# Patient Record
Sex: FEMALE | Race: WHITE | NOT HISPANIC OR LATINO | URBAN - METROPOLITAN AREA
[De-identification: names, ages, dates, MRNs, and addresses within clinical notes are randomized per-mention and may not be internally consistent; named-entity substitution may affect disease eponyms.]

---

## 2017-05-20 ENCOUNTER — INPATIENT (INPATIENT)
Facility: HOSPITAL | Age: 82
LOS: 3 days | Discharge: HOSPICE | End: 2017-05-24
Attending: FAMILY MEDICINE | Admitting: FAMILY MEDICINE
Payer: MEDICARE

## 2017-05-20 VITALS
OXYGEN SATURATION: 89 % | TEMPERATURE: 99 F | HEART RATE: 89 BPM | SYSTOLIC BLOOD PRESSURE: 125 MMHG | DIASTOLIC BLOOD PRESSURE: 54 MMHG | RESPIRATION RATE: 16 BRPM | HEIGHT: 64 IN | WEIGHT: 195.11 LBS

## 2017-05-20 DIAGNOSIS — Z96.659 PRESENCE OF UNSPECIFIED ARTIFICIAL KNEE JOINT: Chronic | ICD-10-CM

## 2017-05-20 DIAGNOSIS — D49.519 NEOPLASM OF UNSPECIFIED BEHAVIOR OF UNSPECIFIED KIDNEY: ICD-10-CM

## 2017-05-20 DIAGNOSIS — I50.21 ACUTE SYSTOLIC (CONGESTIVE) HEART FAILURE: ICD-10-CM

## 2017-05-20 DIAGNOSIS — N39.0 URINARY TRACT INFECTION, SITE NOT SPECIFIED: ICD-10-CM

## 2017-05-20 DIAGNOSIS — Z90.12 ACQUIRED ABSENCE OF LEFT BREAST AND NIPPLE: Chronic | ICD-10-CM

## 2017-05-20 DIAGNOSIS — C79.31 SECONDARY MALIGNANT NEOPLASM OF BRAIN: ICD-10-CM

## 2017-05-20 DIAGNOSIS — I48.91 UNSPECIFIED ATRIAL FIBRILLATION: ICD-10-CM

## 2017-05-20 LAB
ALBUMIN SERPL ELPH-MCNC: 3.2 G/DL — LOW (ref 3.3–5)
ALP SERPL-CCNC: 99 U/L — SIGNIFICANT CHANGE UP (ref 40–120)
ALT FLD-CCNC: 20 U/L — SIGNIFICANT CHANGE UP (ref 12–78)
ANION GAP SERPL CALC-SCNC: 10 MMOL/L — SIGNIFICANT CHANGE UP (ref 5–17)
ANION GAP SERPL CALC-SCNC: 11 MMOL/L — SIGNIFICANT CHANGE UP (ref 5–17)
APPEARANCE UR: (no result)
APTT BLD: 37.9 SEC — HIGH (ref 27.5–37.4)
AST SERPL-CCNC: 15 U/L — SIGNIFICANT CHANGE UP (ref 15–37)
BACTERIA # UR AUTO: (no result)
BASOPHILS # BLD AUTO: 0.1 K/UL — SIGNIFICANT CHANGE UP (ref 0–0.2)
BASOPHILS # BLD AUTO: 0.1 K/UL — SIGNIFICANT CHANGE UP (ref 0–0.2)
BASOPHILS NFR BLD AUTO: 0.4 % — SIGNIFICANT CHANGE UP (ref 0–2)
BASOPHILS NFR BLD AUTO: 0.7 % — SIGNIFICANT CHANGE UP (ref 0–2)
BILIRUB SERPL-MCNC: 0.9 MG/DL — SIGNIFICANT CHANGE UP (ref 0.2–1.2)
BILIRUB UR-MCNC: NEGATIVE — SIGNIFICANT CHANGE UP
BLD GP AB SCN SERPL QL: SIGNIFICANT CHANGE UP
BUN SERPL-MCNC: 18 MG/DL — SIGNIFICANT CHANGE UP (ref 7–23)
BUN SERPL-MCNC: 19 MG/DL — SIGNIFICANT CHANGE UP (ref 7–23)
CALCIUM SERPL-MCNC: 9.1 MG/DL — SIGNIFICANT CHANGE UP (ref 8.5–10.1)
CALCIUM SERPL-MCNC: 9.6 MG/DL — SIGNIFICANT CHANGE UP (ref 8.5–10.1)
CHLORIDE SERPL-SCNC: 103 MMOL/L — SIGNIFICANT CHANGE UP (ref 96–108)
CHLORIDE SERPL-SCNC: 105 MMOL/L — SIGNIFICANT CHANGE UP (ref 96–108)
CK SERPL-CCNC: 28 U/L — SIGNIFICANT CHANGE UP (ref 26–192)
CO2 SERPL-SCNC: 24 MMOL/L — SIGNIFICANT CHANGE UP (ref 22–31)
CO2 SERPL-SCNC: 28 MMOL/L — SIGNIFICANT CHANGE UP (ref 22–31)
COLOR SPEC: YELLOW — SIGNIFICANT CHANGE UP
CREAT SERPL-MCNC: 0.91 MG/DL — SIGNIFICANT CHANGE UP (ref 0.5–1.3)
CREAT SERPL-MCNC: 0.98 MG/DL — SIGNIFICANT CHANGE UP (ref 0.5–1.3)
DIFF PNL FLD: (no result)
EOSINOPHIL # BLD AUTO: 0 K/UL — SIGNIFICANT CHANGE UP (ref 0–0.5)
EOSINOPHIL # BLD AUTO: 0 K/UL — SIGNIFICANT CHANGE UP (ref 0–0.5)
EOSINOPHIL NFR BLD AUTO: 0 % — SIGNIFICANT CHANGE UP (ref 0–6)
EOSINOPHIL NFR BLD AUTO: 0 % — SIGNIFICANT CHANGE UP (ref 0–6)
EPI CELLS # UR: (no result)
GLUCOSE SERPL-MCNC: 133 MG/DL — HIGH (ref 70–99)
GLUCOSE SERPL-MCNC: 98 MG/DL — SIGNIFICANT CHANGE UP (ref 70–99)
GLUCOSE UR QL: NEGATIVE MG/DL — SIGNIFICANT CHANGE UP
GRAM STN FLD: SIGNIFICANT CHANGE UP
HCT VFR BLD CALC: 37.6 % — SIGNIFICANT CHANGE UP (ref 34.5–45)
HCT VFR BLD CALC: 41.9 % — SIGNIFICANT CHANGE UP (ref 34.5–45)
HGB BLD-MCNC: 12.4 G/DL — SIGNIFICANT CHANGE UP (ref 11.5–15.5)
HGB BLD-MCNC: 13.8 G/DL — SIGNIFICANT CHANGE UP (ref 11.5–15.5)
INR BLD: 1.48 RATIO — HIGH (ref 0.88–1.16)
KETONES UR-MCNC: NEGATIVE — SIGNIFICANT CHANGE UP
LACTATE SERPL-SCNC: 1.3 MMOL/L — SIGNIFICANT CHANGE UP (ref 0.7–2)
LEUKOCYTE ESTERASE UR-ACNC: (no result)
LIDOCAIN IGE QN: 53 U/L — LOW (ref 73–393)
LYMPHOCYTES # BLD AUTO: 0.2 K/UL — LOW (ref 1–3.3)
LYMPHOCYTES # BLD AUTO: 0.4 K/UL — LOW (ref 1–3.3)
LYMPHOCYTES # BLD AUTO: 1.2 % — LOW (ref 13–44)
LYMPHOCYTES # BLD AUTO: 2 % — LOW (ref 13–44)
MAGNESIUM SERPL-MCNC: 2 MG/DL — SIGNIFICANT CHANGE UP (ref 1.6–2.6)
MCHC RBC-ENTMCNC: 31.2 PG — SIGNIFICANT CHANGE UP (ref 27–34)
MCHC RBC-ENTMCNC: 31.4 PG — SIGNIFICANT CHANGE UP (ref 27–34)
MCHC RBC-ENTMCNC: 33 GM/DL — SIGNIFICANT CHANGE UP (ref 32–36)
MCHC RBC-ENTMCNC: 33 GM/DL — SIGNIFICANT CHANGE UP (ref 32–36)
MCV RBC AUTO: 94.7 FL — SIGNIFICANT CHANGE UP (ref 80–100)
MCV RBC AUTO: 95.4 FL — SIGNIFICANT CHANGE UP (ref 80–100)
MONOCYTES # BLD AUTO: 1 K/UL — HIGH (ref 0–0.9)
MONOCYTES # BLD AUTO: 1.5 K/UL — HIGH (ref 0–0.9)
MONOCYTES NFR BLD AUTO: 5 % — SIGNIFICANT CHANGE UP (ref 2–14)
MONOCYTES NFR BLD AUTO: 7.3 % — SIGNIFICANT CHANGE UP (ref 2–14)
NEUTROPHILS # BLD AUTO: 18 K/UL — HIGH (ref 1.8–7.4)
NEUTROPHILS # BLD AUTO: 18.1 K/UL — HIGH (ref 1.8–7.4)
NEUTROPHILS NFR BLD AUTO: 90 % — HIGH (ref 43–77)
NEUTROPHILS NFR BLD AUTO: 93.3 % — HIGH (ref 43–77)
NITRITE UR-MCNC: POSITIVE
NT-PROBNP SERPL-SCNC: 6138 PG/ML — HIGH (ref 0–450)
PH UR: 6 — SIGNIFICANT CHANGE UP (ref 5–8)
PLATELET # BLD AUTO: 238 K/UL — SIGNIFICANT CHANGE UP (ref 150–400)
PLATELET # BLD AUTO: 298 K/UL — SIGNIFICANT CHANGE UP (ref 150–400)
POTASSIUM SERPL-MCNC: 3.9 MMOL/L — SIGNIFICANT CHANGE UP (ref 3.5–5.3)
POTASSIUM SERPL-MCNC: 4.2 MMOL/L — SIGNIFICANT CHANGE UP (ref 3.5–5.3)
POTASSIUM SERPL-SCNC: 3.9 MMOL/L — SIGNIFICANT CHANGE UP (ref 3.5–5.3)
POTASSIUM SERPL-SCNC: 4.2 MMOL/L — SIGNIFICANT CHANGE UP (ref 3.5–5.3)
PROT SERPL-MCNC: 7 GM/DL — SIGNIFICANT CHANGE UP (ref 6–8.3)
PROT UR-MCNC: 30 MG/DL
PROTHROM AB SERPL-ACNC: 16.1 SEC — HIGH (ref 9.8–12.7)
RBC # BLD: 3.94 M/UL — SIGNIFICANT CHANGE UP (ref 3.8–5.2)
RBC # BLD: 4.43 M/UL — SIGNIFICANT CHANGE UP (ref 3.8–5.2)
RBC # FLD: 14.2 % — SIGNIFICANT CHANGE UP (ref 10.3–14.5)
RBC # FLD: 14.5 % — SIGNIFICANT CHANGE UP (ref 10.3–14.5)
RBC CASTS # UR COMP ASSIST: (no result) /HPF (ref 0–4)
SODIUM SERPL-SCNC: 140 MMOL/L — SIGNIFICANT CHANGE UP (ref 135–145)
SODIUM SERPL-SCNC: 141 MMOL/L — SIGNIFICANT CHANGE UP (ref 135–145)
SP GR SPEC: 1.01 — SIGNIFICANT CHANGE UP (ref 1.01–1.02)
SPECIMEN SOURCE: SIGNIFICANT CHANGE UP
SPECIMEN SOURCE: SIGNIFICANT CHANGE UP
TROPONIN I SERPL-MCNC: 0.06 NG/ML — HIGH (ref 0.01–0.04)
TROPONIN I SERPL-MCNC: 0.07 NG/ML — HIGH (ref 0.01–0.04)
TYPE + AB SCN PNL BLD: SIGNIFICANT CHANGE UP
UROBILINOGEN FLD QL: NEGATIVE MG/DL — SIGNIFICANT CHANGE UP
WBC # BLD: 19.4 K/UL — HIGH (ref 3.8–10.5)
WBC # BLD: 20 K/UL — HIGH (ref 3.8–10.5)
WBC # FLD AUTO: 19.4 K/UL — HIGH (ref 3.8–10.5)
WBC # FLD AUTO: 20 K/UL — HIGH (ref 3.8–10.5)
WBC UR QL: SIGNIFICANT CHANGE UP /HPF (ref 0–5)

## 2017-05-20 PROCEDURE — 71275 CT ANGIOGRAPHY CHEST: CPT | Mod: 26

## 2017-05-20 PROCEDURE — 99285 EMERGENCY DEPT VISIT HI MDM: CPT

## 2017-05-20 PROCEDURE — 99221 1ST HOSP IP/OBS SF/LOW 40: CPT

## 2017-05-20 PROCEDURE — 93010 ELECTROCARDIOGRAM REPORT: CPT

## 2017-05-20 PROCEDURE — 71010: CPT | Mod: 26

## 2017-05-20 PROCEDURE — 70450 CT HEAD/BRAIN W/O DYE: CPT | Mod: 26

## 2017-05-20 PROCEDURE — 74177 CT ABD & PELVIS W/CONTRAST: CPT | Mod: 26

## 2017-05-20 RX ORDER — FUROSEMIDE 40 MG
40 TABLET ORAL DAILY
Qty: 0 | Refills: 0 | Status: DISCONTINUED | OUTPATIENT
Start: 2017-05-20 | End: 2017-05-20

## 2017-05-20 RX ORDER — SODIUM CHLORIDE 9 MG/ML
1500 INJECTION INTRAMUSCULAR; INTRAVENOUS; SUBCUTANEOUS ONCE
Qty: 0 | Refills: 0 | Status: COMPLETED | OUTPATIENT
Start: 2017-05-20 | End: 2017-05-20

## 2017-05-20 RX ORDER — CEFEPIME 1 G/1
1000 INJECTION, POWDER, FOR SOLUTION INTRAMUSCULAR; INTRAVENOUS EVERY 12 HOURS
Qty: 0 | Refills: 0 | Status: DISCONTINUED | OUTPATIENT
Start: 2017-05-20 | End: 2017-05-21

## 2017-05-20 RX ORDER — ONDANSETRON 8 MG/1
4 TABLET, FILM COATED ORAL ONCE
Qty: 0 | Refills: 0 | Status: COMPLETED | OUTPATIENT
Start: 2017-05-20 | End: 2017-05-20

## 2017-05-20 RX ORDER — ATENOLOL 25 MG/1
25 TABLET ORAL DAILY
Qty: 0 | Refills: 0 | Status: DISCONTINUED | OUTPATIENT
Start: 2017-05-20 | End: 2017-05-24

## 2017-05-20 RX ORDER — POTASSIUM CHLORIDE 20 MEQ
20 PACKET (EA) ORAL DAILY
Qty: 0 | Refills: 0 | Status: DISCONTINUED | OUTPATIENT
Start: 2017-05-20 | End: 2017-05-23

## 2017-05-20 RX ORDER — CEFEPIME 1 G/1
1000 INJECTION, POWDER, FOR SOLUTION INTRAMUSCULAR; INTRAVENOUS ONCE
Qty: 0 | Refills: 0 | Status: COMPLETED | OUTPATIENT
Start: 2017-05-20 | End: 2017-05-20

## 2017-05-20 RX ORDER — FUROSEMIDE 40 MG
40 TABLET ORAL DAILY
Qty: 0 | Refills: 0 | Status: DISCONTINUED | OUTPATIENT
Start: 2017-05-20 | End: 2017-05-23

## 2017-05-20 RX ORDER — ASPIRIN/CALCIUM CARB/MAGNESIUM 324 MG
325 TABLET ORAL ONCE
Qty: 0 | Refills: 0 | Status: COMPLETED | OUTPATIENT
Start: 2017-05-20 | End: 2017-05-20

## 2017-05-20 RX ORDER — SODIUM CHLORIDE 9 MG/ML
1000 INJECTION INTRAMUSCULAR; INTRAVENOUS; SUBCUTANEOUS ONCE
Qty: 0 | Refills: 0 | Status: COMPLETED | OUTPATIENT
Start: 2017-05-20 | End: 2017-05-20

## 2017-05-20 RX ORDER — VANCOMYCIN HCL 1 G
1000 VIAL (EA) INTRAVENOUS ONCE
Qty: 0 | Refills: 0 | Status: COMPLETED | OUTPATIENT
Start: 2017-05-20 | End: 2017-05-20

## 2017-05-20 RX ORDER — VALSARTAN 80 MG/1
80 TABLET ORAL DAILY
Qty: 0 | Refills: 0 | Status: DISCONTINUED | OUTPATIENT
Start: 2017-05-20 | End: 2017-05-24

## 2017-05-20 RX ORDER — ACETAMINOPHEN 500 MG
1000 TABLET ORAL ONCE
Qty: 0 | Refills: 0 | Status: COMPLETED | OUTPATIENT
Start: 2017-05-20 | End: 2017-05-20

## 2017-05-20 RX ORDER — PANTOPRAZOLE SODIUM 20 MG/1
40 TABLET, DELAYED RELEASE ORAL DAILY
Qty: 0 | Refills: 0 | Status: DISCONTINUED | OUTPATIENT
Start: 2017-05-20 | End: 2017-05-23

## 2017-05-20 RX ORDER — DEXAMETHASONE 0.5 MG/5ML
4 ELIXIR ORAL EVERY 6 HOURS
Qty: 0 | Refills: 0 | Status: DISCONTINUED | OUTPATIENT
Start: 2017-05-20 | End: 2017-05-24

## 2017-05-20 RX ORDER — SIMVASTATIN 20 MG/1
10 TABLET, FILM COATED ORAL AT BEDTIME
Qty: 0 | Refills: 0 | Status: DISCONTINUED | OUTPATIENT
Start: 2017-05-20 | End: 2017-05-24

## 2017-05-20 RX ADMIN — CEFEPIME 100 MILLIGRAM(S): 1 INJECTION, POWDER, FOR SOLUTION INTRAMUSCULAR; INTRAVENOUS at 06:42

## 2017-05-20 RX ADMIN — ATENOLOL 25 MILLIGRAM(S): 25 TABLET ORAL at 12:00

## 2017-05-20 RX ADMIN — Medication 4 MILLIGRAM(S): at 18:54

## 2017-05-20 RX ADMIN — CEFEPIME 100 MILLIGRAM(S): 1 INJECTION, POWDER, FOR SOLUTION INTRAMUSCULAR; INTRAVENOUS at 22:28

## 2017-05-20 RX ADMIN — SODIUM CHLORIDE 1000 MILLILITER(S): 9 INJECTION INTRAMUSCULAR; INTRAVENOUS; SUBCUTANEOUS at 06:00

## 2017-05-20 RX ADMIN — Medication 40 MILLIGRAM(S): at 11:59

## 2017-05-20 RX ADMIN — Medication 250 MILLIGRAM(S): at 07:55

## 2017-05-20 RX ADMIN — Medication 1000 MILLIGRAM(S): at 06:36

## 2017-05-20 RX ADMIN — ONDANSETRON 4 MILLIGRAM(S): 8 TABLET, FILM COATED ORAL at 06:00

## 2017-05-20 RX ADMIN — SODIUM CHLORIDE 1500 MILLILITER(S): 9 INJECTION INTRAMUSCULAR; INTRAVENOUS; SUBCUTANEOUS at 07:55

## 2017-05-20 RX ADMIN — Medication 20 MILLIEQUIVALENT(S): at 12:00

## 2017-05-20 RX ADMIN — Medication 325 MILLIGRAM(S): at 07:55

## 2017-05-20 RX ADMIN — Medication 40 MILLIGRAM(S): at 12:08

## 2017-05-20 NOTE — H&P ADULT - NSHPPHYSICALEXAM_GEN_ALL_CORE
Physical Exam: Vital Signs Last 24 Hrs  T(C): 37.2, Max: 38.4 (05-20 @ 06:43)  T(F): 99, Max: 101.1 (05-20 @ 06:43)  HR: 83 (83 - 103)  BP: 134/67 (124/56 - 151/93)  BP(mean): --  RR: 22 (16 - 30)  SpO2: 98% (89% - 98%)        HEENT: PRRL EOMI,[place]    MOUTH/TEETH/GUMS: Clear    NECK: JVD +1    LUNGS: mild basilar rales, mild upper airway congestion,     HEART: S1,S2  irregularly irregular    ABDOMEN: soft nontender    EXTREMITIES: edema Yes: trace pedal edema +1    MUSCULOSKELETAL: arthritic changes    NEURO: no tremor, no focal signs.    SKIN: no rash   : CVA negative, rectal deferred.

## 2017-05-20 NOTE — ED ADULT NURSE REASSESSMENT NOTE - NS ED NURSE REASSESS COMMENT FT1
Pt's exhibiting rectal temp of 101.1. MD rosa made aware. Tylenol and Cefepime 1 gm given as ordered. Vanco and 1.5 ml of NS to follow will continue to monitor.

## 2017-05-20 NOTE — ED PROVIDER NOTE - MEDICAL DECISION MAKING DETAILS
Pt with fever, (+) troponin, UTI.  awaiting CT results but pt will be admitted to the hospitalist service.

## 2017-05-20 NOTE — PROVIDER CONTACT NOTE (OTHER) - SITUATION
left a message on department phone
Call placed to  Neurosurgery aware of consult.
Call placed to Dr.Bhardi [facility] aware of consult.
notified service; spoke to Adry

## 2017-05-20 NOTE — ED ADULT NURSE REASSESSMENT NOTE - NS ED NURSE REASSESS COMMENT FT1
Urine sample collected via straight cath. Pt tolerated well. Urine yellow and cloudy with foul odor. 100 ml in return. Sample sent to lab.

## 2017-05-20 NOTE — ED PROVIDER NOTE - OBJECTIVE STATEMENT
89 y/o female with a h/o AFib on eliquis, dementia, angina, HTN, anemia, incontinence, anxiety, BIBA from [facility] assisted [facility] for SOB, nausea, vomiting and abd discomfort since today. As per nursing home paperwork, pt is confused at baseline.

## 2017-05-20 NOTE — H&P ADULT - ASSESSMENT
89 y/o female with a h/o AFib on eliquis, dementia, angina, HTN, anemia, incontinence, anxiety, who was sent to the ER from the [facility], found with brain metastasis small hemorrhage, renal mass, Cistitis/UTI, CHF, elevated troponin. 89 y/o female with a h/o AFib on eliquis, dementia, angina, HTN, anemia, incontinence, anxiety, who was sent to the ER from the [facility], found with brain metastasis small hemorrhage, renal mass, Cystitis/UTI, CHF, elevated troponin.

## 2017-05-20 NOTE — ED PROVIDER NOTE - DETAILS:
I, Kristine Maradiaga, performed the initial face to face bedside interview with this patient regarding history of present illness, review of symptoms and relevant past medical, social and family history.  I completed an independent physical examination.  I was the initial provider who evaluated this patient.   The history, relevant review of systems, past medical and surgical history, medical decision making, and physical examination was documented by the scribe in my presence and I attest to the accuracy of the documentation.

## 2017-05-20 NOTE — ED ADULT NURSE NOTE - OBJECTIVE STATEMENT
Pt presented to ED for SOB. As per EMS, pt was c/o SOB, N&V and abd discomfort today. Pt poor historian due to Dementia, and confused at baseline. Upon arrival to ED, Pt confused and oriented to self only, satting @ 98 on O2 2L, and Denies any discomfort at this time.

## 2017-05-20 NOTE — CONSULT NOTE ADULT - PROBLEM SELECTOR RECOMMENDATION 9
1. MRI Brain with and without contrast for consideration of possible Stereotaxic Radiosurgery  2. Decadron for Cerebral Edema

## 2017-05-20 NOTE — H&P ADULT - HISTORY OF PRESENT ILLNESS
The patient is a 89 yo female a h/o AFib on eliquis, dementia, angina, HTN, anemia, incontinence, anxiety,who was sent to the ER form the [facility] living for SOB, nausea, vomiting and abd discomfort.  The patient is not able to give Hx. secondary to dementia, [place]. Transfer records reviewed.   The patient is in bed , alert, disoriented is not c/o pain.

## 2017-05-20 NOTE — ED ADULT NURSE REASSESSMENT NOTE - NS ED NURSE REASSESS COMMENT FT1
Tachypnea, pulse ox 92-94% prior to transfer to .  Dr. Azul notified, IV and PO Lasix administered.  Pulse ox 96-97% on 4L after Lasix administration.  Pt incontinent of large amount of urine, pericare and diaper change done.

## 2017-05-20 NOTE — ED PROVIDER NOTE - MUSCULOSKELETAL, MLM
full muscle strength in all 4 extremities. Spine appears normal, range of motion is not limited, no muscle or joint tenderness

## 2017-05-20 NOTE — H&P ADULT - NSHPLABSRESULTS_GEN_ALL_CORE
13.8   19.4  )-----------( 298      ( 20 May 2017 06:01 )             41.9       05-20    141  |  103  |  19  ----------------------------<  133<H>  3.9   |  28  |  0.98    Ca    9.6      20 May 2017 06:01  Mg     2.0     05-20    TPro  7.0  /  Alb  3.2<L>  /  TBili  0.9  /  DBili  x   /  AST  15  /  ALT  20  /  AlkPhos  99  05-20    UA: wbc TNTC, bld Lg., Nitrate positive  CT brain : Vasogenic edema in the left frontal lobe with associated small foci of   high attenuation may represent hemorrhage, calcification, or small   underlying lesion. No mass effect or midline shift. Primary consideration   is metastasis.  CT abd:Bilateral solid renal masses indicative of neoplasm. No collecting system   or renal vascular invasion. 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  Thickening of the uterine endometrium up to 10 mm. Tissue sampling is   advised.Large hiatal hernia containing a nearly intrathoracic stomach. No bowel   obstruction.Diffuse urinary bladder wall thickening and perivesical stranding   indicative of cystitis.  CTA Chest: Neg for PE,Cardiomegaly, pulmonary edema, and trace bilateral pleural effusions  EKG atrial fib at 88bpm no acute ST changes.

## 2017-05-20 NOTE — H&P ADULT - NSHPREVIEWOFSYSTEMS_GEN_ALL_CORE
ROS: not obtainable secondary to Dementia, [place], denies CP, had mild SOB, had severe hearing loss.

## 2017-05-21 DIAGNOSIS — R74.8 ABNORMAL LEVELS OF OTHER SERUM ENZYMES: ICD-10-CM

## 2017-05-21 DIAGNOSIS — Z71.89 OTHER SPECIFIED COUNSELING: ICD-10-CM

## 2017-05-21 LAB — NT-PROBNP SERPL-SCNC: HIGH PG/ML (ref 0–450)

## 2017-05-21 PROCEDURE — 70553 MRI BRAIN STEM W/O & W/DYE: CPT | Mod: 26

## 2017-05-21 RX ORDER — CEFTRIAXONE 500 MG/1
INJECTION, POWDER, FOR SOLUTION INTRAMUSCULAR; INTRAVENOUS
Qty: 0 | Refills: 0 | Status: DISCONTINUED | OUTPATIENT
Start: 2017-05-21 | End: 2017-05-24

## 2017-05-21 RX ORDER — CEFTRIAXONE 500 MG/1
2 INJECTION, POWDER, FOR SOLUTION INTRAMUSCULAR; INTRAVENOUS EVERY 24 HOURS
Qty: 0 | Refills: 0 | Status: DISCONTINUED | OUTPATIENT
Start: 2017-05-22 | End: 2017-05-24

## 2017-05-21 RX ORDER — CEFTRIAXONE 500 MG/1
2 INJECTION, POWDER, FOR SOLUTION INTRAMUSCULAR; INTRAVENOUS ONCE
Qty: 0 | Refills: 0 | Status: COMPLETED | OUTPATIENT
Start: 2017-05-21 | End: 2017-05-21

## 2017-05-21 RX ADMIN — Medication 4 MILLIGRAM(S): at 18:05

## 2017-05-21 RX ADMIN — PANTOPRAZOLE SODIUM 40 MILLIGRAM(S): 20 TABLET, DELAYED RELEASE ORAL at 11:23

## 2017-05-21 RX ADMIN — CEFTRIAXONE 100 GRAM(S): 500 INJECTION, POWDER, FOR SOLUTION INTRAMUSCULAR; INTRAVENOUS at 13:09

## 2017-05-21 RX ADMIN — Medication 4 MILLIGRAM(S): at 11:23

## 2017-05-21 RX ADMIN — Medication 4 MILLIGRAM(S): at 05:54

## 2017-05-21 RX ADMIN — ATENOLOL 25 MILLIGRAM(S): 25 TABLET ORAL at 05:53

## 2017-05-21 RX ADMIN — Medication 20 MILLIEQUIVALENT(S): at 11:23

## 2017-05-21 RX ADMIN — Medication 40 MILLIGRAM(S): at 05:54

## 2017-05-21 RX ADMIN — VALSARTAN 80 MILLIGRAM(S): 80 TABLET ORAL at 05:55

## 2017-05-21 RX ADMIN — SIMVASTATIN 10 MILLIGRAM(S): 20 TABLET, FILM COATED ORAL at 22:08

## 2017-05-21 RX ADMIN — CEFEPIME 100 MILLIGRAM(S): 1 INJECTION, POWDER, FOR SOLUTION INTRAMUSCULAR; INTRAVENOUS at 05:53

## 2017-05-21 RX ADMIN — Medication 4 MILLIGRAM(S): at 00:12

## 2017-05-21 NOTE — PROGRESS NOTE ADULT - SUBJECTIVE AND OBJECTIVE BOX
Neurosurgery Progress note               This is a 89 y/o female with a Past Medical History significant for a baseline Dementia she is unable to provide any history . History  is obtained from NH paper work and chart h/o AFib on Eliquis , dementia, angina, HTN, anemia, incontinence, anxiety. she is sent from [facility] for SOB, nausea, vomiting and abd discomfort since last night . Work up revealed Multiple lesions on a non contrast Brain CT , also identified Bilateral Renal Masses consistent with neoplasm as well as Retroperitoneal tumor deposit measuring 2.6 cm . We are called to evaluate. She is seen and examined in the ED , no family at the bedside to provide history. 		    : Seen this afternoon upon return from MRI, By RN report she had episode last night of a mental status change sternal rub could not arouse her , however today she is seen awake alert following commands no new complaints of pain     MEDICATIONS  (STANDING):  simvastatin 10milliGRAM(s) Oral at bedtime  ATENolol  Tablet 25milliGRAM(s) Oral daily  potassium chloride    Tablet ER 20milliEquivalent(s) Oral daily  dexamethasone  Injectable 4milliGRAM(s) IV Push every 6 hours  pantoprazole  Injectable 40milliGRAM(s) IV Push daily  valsartan 80milliGRAM(s) Oral daily  furosemide   Injectable 40milliGRAM(s) IV Push daily  cefTRIAXone   IVPB  IV Intermittent     Vital Signs Last 24 Hrs  T(C): 36.2, Max: 37 (- @ 06:07)  T(F): 97.1, Max: 98.6 (05- @ 06:07)  HR: 81 (76 - 91)  BP: 140/76 (134/78 - 154/82)  BP(mean): --  RR: 17 (17 - 17)  SpO2: 100% (100% - 100%)    PHYSICAL EXAM:  Pleasant , Obese Female , Hard of hearing   Extremities:  1+ pitting edema   Neuro:     Mental status:   The patient is alert, attentive, and oriented to self, only   Speech is clear and fluent   Fund of knowledge, is limited due to Dementia   Mood:  Confused   Cranial nerves:  CN II: Visual fields are full to confrontation.   CN III, IV, and VI: At primary gaze, there is no eye deviation, No ptosis.   CN V: Facial sensation is intact to light touch   CN VII: Face is symmetric with normal eye closure and smile.  CN VII: Hard of Hearing   CN IX, X: Palate elevates symmetrically. Phonation is normal.  CN XI: Head turning and shoulder shrug are intact  CN XII: Tongue is midline with normal movements and no atrophy.  Motor:   No Pronator drift  Normal Muscle bulk and tone   Strength is full bilaterally. No Noted pill rolling, No Tremor  Deltoids & Biceps, Triceps 5/5 bilaterally    / Wrist extension / flex 5/5 bilaterally  Iliopsoas, Hamstrings & Quads 5/5 bilaterally   Anterior Tibialis & Gastrocnemius 5/5 bilaterally   Dorsiflexion & Plantar Flex 5/5 bilaterally  Sensory: intact to light touch  Gait:  is not tested       LABS:                        12.4   20.0  )-----------( 238      ( 20 May 2017 12:18 )             37.6     05-20    140  |  105  |  18  ----------------------------<  98  4.2   |  24  |  0.91    Ca    9.1      20 May 2017 12:18  Mg     2.0     -    TPro  7.0  /  Alb  3.2<L>  /  TBili  0.9  /  DBili  x   /  AST  15  /  ALT  20  /  Alk Phos  99  05-20    PT/INR - ( 20 May 2017 06:01 )   PT: 16.1 sec;   INR: 1.48 ratio         PTT - ( 20 May 2017 06:01 )  PTT:37.9 sec  Urinalysis Basic - ( 20 May 2017 05:17 )    Color: Yellow / Appearance: very cloudy / S.015 / pH: x  Gluc: x / Ketone: Negative  / Bili: Negative / Urobili: Negative mg/dL   Blood: x / Protein: 30 mg/dL / Nitrite: Positive   Leuk Esterase: Moderate / RBC: 3-5 /HPF / WBC TNTC /HPF   Sq Epi: x / Non Sq Epi: Moderate / Bacteria: Few    RADIOLOGY & ADDITIONAL TESTS:  EXAM:  CT BRAIN                            PROCEDURE DATE:  2017        INTERPRETATION:  CT BRAIN    HISTORY:  vomiting/dementia; on eloquis    TECHNIQUE: CT of the head was performed without intravenous contrast.   Multiplanar reformatted images were then generated from the axial   acquired data.  This study was performed using automatic exposure control   (radiation dose reduction software) to obtain a diagnostic image quality   scan with patient dose as low as reasonably achievable.    COMPARISON: None available    FINDINGS:     INTRACRANIAL FINDINGS: Left frontal white matter hypoattenuation   suggestive of vasogenic edema. Associated small foci of hyperdensity may   represent hemorrhage, calcification, or small underlying lesion.No mass   effect or midline shift. No acute territorial infarct.    EXTRACRANIAL FINDINGS: There is expansion of the sella turcica with   surrounding skull base bony destruction of the sella and clivus. The   orbital contents are unremarkable. The visualized paranasal sinuses are   well aerated. The mastoid air cells are clear.    IMPRESSION:     Vasogenic edema in the left frontal lobe with associated small foci of   high attenuation may represent hemorrhage, calcification, or small   underlyinglesion. No mass effect or midline shift. Primary consideration   is metastasis.    Expansile lesion in the sella turcica with surrounding bony destruction   of the skull base. Underlying metastasis is likely given the findings in   the abdomen and pelvis.    Critical value:  I discussed the finding of this report with Dr. Dr. Renee at 9:00 AM on 2017.  Critical value policy of the hospital   was followed.  Read back and confirmation of receipt of this   communication was performed.  Thisverbal communication supplements the   text report of this document.    KELSY KELLEY   This document has been electronically signed. May 20 2017  9:03AM      MRI of the brain Completed But not read yet     Dx: Renal Cell Carcinoma , Metastatic Disease to brain with Cerebral edema     A/P: 89 year old female with known Dementia presented with Nausea and vomiting , work up revealed Bilateral  Renal masses , retroperitoneal Tumor deposit and multiple Brain Lesions with associated Vasogenic Edema with minimal mass effect . Stable     1. Leukocytosis due to Decadron and Possible UTI   2. Continue decadron 4 mg IV Q 6   3. Diagnosis to be discussed with Grandson / Son per Primary team [phone]  4. Possible XRT to Brain Mets , No surgical intervention recommended given advanced age   5. Awaiting MRI reading   6. Case discussed with Dr Cordoba who concurs

## 2017-05-21 NOTE — PROGRESS NOTE ADULT - PROBLEM SELECTOR PLAN 6
discussed in length with family at bedside  DNR/DNI, form signed  if any changes family wants to call Aiyana [phone] cell, [phone]

## 2017-05-21 NOTE — PROGRESS NOTE ADULT - SUBJECTIVE AND OBJECTIVE BOX
Subjective:  Patient is a 89y old  Female who presents with a chief complaint of Nausea, Vomiting, SOB, abdominal discomfort     HPI:  The patient is a 89 yo female a h/o AFib on eliquis, dementia, angina, HTN, anemia, incontinence, anxiety, h/o of prior brain tumor s/p cyber knife, h/o breast CA s/p mastectomy  admitted on   form the [facility] assisted living for SOB, nausea, vomiting and abd discomfort.     - Patient seen and examined at bedside earlier today, hard to hear, poor historian, denies pain, more awake and alert, poor PO intake, family at bedside, plan of care discussed in length     Review of system- Rest of the review of system are negative except mentioned in HPI    OBJECTIVE:   T(C): 36.2, Max: 37 ( @ 06:07)  HR: 81 (76 - 84)  BP: 142/83 (140/76 - 154/82)  RR: 15 (15 - 17)  SpO2: 96% (96% - 100%)  Wt(kg): --  Daily     Daily Weight in k.1 (21 May 2017 07:39)    PHYSICAL EXAM:  GENERAL: NAD  NERVOUS SYSTEM:  Alert & Oriented X1, non focal   HEAD:  Atraumatic, Normocephalic  EYES: EOMI, PERRLA, conjunctiva and sclera clear  HEENT: Moist mucous membranes  NECK: Supple, No JVD  CHEST/LUNG: Clear to auscultation bilaterally; No rales, no rhonchi, no wheezing, or rubs  HEART: Regular rate and rhythm; No murmurs, rubs, or gallops  ABDOMEN: Soft, Nontender, Nondistended; Bowel sounds present  GENITOURINARY- Voiding, no suprapubic tenderness  EXTREMITIES:  2+ Peripheral Pulses, No clubbing, cyanosis, or edema  MUSCULOSKELETAL:- No muscle tenderness, Muscle tone normal, No joint tenderness, no Joint swelling, Joint range of motion-normal  SKIN-no rash, no lesion    LABS:                     140  |  105  |  18  ----------------------------<  98  4.2   |  24  |  0.91    Ca    9.1      20 May 2017 12:18  Mg     2.0         TPro  7.0  /  Alb  3.2<L>  /  TBili  0.9  /  DBili  x   /  AST  15  /  ALT  20  /  AlkPhos  99  05-20                        12.4   20.0  )-----------( 238      ( 20 May 2017 12:18 )             37.6       CARDIAC MARKERS ( 20 May 2017 12:18 )  0.071 ng/mL / x     / x     / x     / x      CARDIAC MARKERS ( 20 May 2017 09:38 )  0.076 ng/mL / x     / x     / x     / x      CARDIAC MARKERS ( 20 May 2017 06:01 )  0.060 ng/mL / x     / 28 U/L / x     / x            LIVER FUNCTIONS - ( 20 May 2017 06:01 )  Alb: 3.2 g/dL / Pro: 7.0 gm/dL / ALK PHOS: 99 U/L / ALT: 20 U/L / AST: 15 U/L / GGT: x             PT/INR - ( 20 May 2017 06:01 )   PT: 16.1 sec;   INR: 1.48 ratio         PTT - ( 20 May 2017 06:01 )  PTT:37.9 sec    Urinalysis Basic - ( 20 May 2017 05:17 )    Color: Yellow / Appearance: very cloudy / S.015 / pH: x  Gluc: x / Ketone: Negative  / Bili: Negative / Urobili: Negative mg/dL   Blood: x / Protein: 30 mg/dL / Nitrite: Positive   Leuk Esterase: Moderate / RBC: 3-5 /HPF / WBC TNTC /HPF   Sq Epi: x / Non Sq Epi: Moderate / Bacteria: Few                 12.4   20.0  )-----------( 238      ( 20 May 2017 12:18 )             37.6     05-20    140  |  105  |  18  ----------------------------<  98  4.2   |  24  |  0.91    Ca    9.1      20 May 2017 12:18  Mg     2.0     05-20    TPro  7.0  /  Alb  3.2<L>  /  TBili  0.9  /  DBili  x   /  AST  15  /  ALT  20  /  AlkPhos  99  05-20    PT/INR - ( 20 May 2017 06:01 )   PT: 16.1 sec;   INR: 1.48 ratio         PTT - ( 20 May 2017 06:01 )  PTT:37.9 sec  CARDIAC MARKERS ( 20 May 2017 12:18 )  0.071 ng/mL / x     / x     / x     / x      CARDIAC MARKERS ( 20 May 2017 09:38 )  0.076 ng/mL / x     / x     / x     / x      CARDIAC MARKERS ( 20 May 2017 06:01 )  0.060 ng/mL / x     / 28 U/L / x     / x          Urinalysis Basic - ( 20 May 2017 05:17 )    Color: Yellow / Appearance: very cloudy / S.015 / pH: x  Gluc: x / Ketone: Negative  / Bili: Negative / Urobili: Negative mg/dL   Blood: x / Protein: 30 mg/dL / Nitrite: Positive   Leuk Esterase: Moderate / RBC: 3-5 /HPF / WBC TNTC /HPF   Sq Epi: x / Non Sq Epi: Moderate / Bacteria: Few        CAPILLARY BLOOD GLUCOSE  RECENT CULTURES:  Culture - Blood (17 @ 06:01)    Gram Stain:   Growth in aerobic bottle: Gram Negative Rods    Specimen Source: .Blood None    Culture Results:   Growth in aerobic bottle: Escherichia coli  Susceptibility to follow.    Culture - Blood (17 @ 06:01)    Gram Stain:   Growth in aerobic and anaerobic bottles: Gram Negative Rods    Specimen Source: .Blood None    Culture Results:   Growth in aerobic and anaerobic bottles: Escherichia coli  See previous culture [id number]      RADIOLOGY & ADDITIONAL TESTS:    CT ABDOMEN AND PELVIS IC                2017  Bilateral solid renal masses indicative of neoplasm. No collecting system   or renal vascular invasion. 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    Thickening of the uterine endometrium up to 10 mm. Tissue sampling is   advised.    Large hiatal hernia containing a nearly intrathoracic stomach. No bowel   obstruction.    Diffuse urinary bladder wall thickening and perivesical stranding   indicative of cystitis.    These results were communicated to Dr. Renee by me over telephone at   9:00 AM on 2017.     CT ANGIO CHEST PE PROTOCOL IC         2017  No pulmonary embolism in the visualized segments. Limited evaluation of   the lingular and left lower lobe subsegmental and segmental branches   secondary to respiratory motion.    Cardiomegaly, pulmonary edema, and trace bilateral pleural effusions.    1.1 cm soft tissue nodule in the medial upper right breast, for which   follow-up dedicated mammographic imaging is advised.    CT BRAIN       2017  Vasogenic edema in the left frontal lobe with associated small foci of   high attenuation may represent hemorrhage, calcification, or small   underlyinglesion. No mass effect or midline shift. Primary consideration   is metastasis.    Expansile lesion in the sella turcica with surrounding bony destruction   of the skull base. Underlying metastasis is likely given the findings in   the abdomen and pelvis.    MEDICATIONS  (STANDING):  simvastatin 10milliGRAM(s) Oral at bedtime  ATENolol  Tablet 25milliGRAM(s) Oral daily  potassium chloride    Tablet ER 20milliEquivalent(s) Oral daily  dexamethasone  Injectable 4milliGRAM(s) IV Push every 6 hours  pantoprazole  Injectable 40milliGRAM(s) IV Push daily  valsartan 80milliGRAM(s) Oral daily  furosemide   Injectable 40milliGRAM(s) IV Push daily  cefTRIAXone   IVPB  IV Intermittent

## 2017-05-21 NOTE — PROGRESS NOTE ADULT - PROBLEM SELECTOR PLAN 4
Obtained Neurosurgical consult from Dr. Cordoba, case discussed with Dr. Cordoba.  CT head noted  -MRI brain - pending

## 2017-05-21 NOTE — PROGRESS NOTE ADULT - ASSESSMENT
89 yo female a h/o AFib on eliquis, dementia, angina, HTN, anemia, incontinence, anxiety, h/o of prior brain tumor s/p cyber knife, h/o breast CA s/p mastectomy  admitted on 5/20  form the [facility] for SOB, nausea, vomiting and abd discomfort.  Found brain metastasis small hemorrhage, renal mass, Cystitis/UTI, CHF, elevated troponin.

## 2017-05-21 NOTE — PROGRESS NOTE ADULT - PROBLEM SELECTOR PLAN 1
telemetry,   systolic vs diastolic  contiue IV laurie, BB, statins,  Diovan, cardiology consult.  2 d echo

## 2017-05-22 DIAGNOSIS — N28.9 DISORDER OF KIDNEY AND URETER, UNSPECIFIED: ICD-10-CM

## 2017-05-22 LAB
-  AMIKACIN: SIGNIFICANT CHANGE UP
-  AMIKACIN: SIGNIFICANT CHANGE UP
-  AMPICILLIN/SULBACTAM: SIGNIFICANT CHANGE UP
-  AMPICILLIN/SULBACTAM: SIGNIFICANT CHANGE UP
-  AMPICILLIN: SIGNIFICANT CHANGE UP
-  AMPICILLIN: SIGNIFICANT CHANGE UP
-  AZTREONAM: SIGNIFICANT CHANGE UP
-  AZTREONAM: SIGNIFICANT CHANGE UP
-  CEFAZOLIN: SIGNIFICANT CHANGE UP
-  CEFAZOLIN: SIGNIFICANT CHANGE UP
-  CEFEPIME: SIGNIFICANT CHANGE UP
-  CEFEPIME: SIGNIFICANT CHANGE UP
-  CEFOXITIN: SIGNIFICANT CHANGE UP
-  CEFOXITIN: SIGNIFICANT CHANGE UP
-  CEFTAZIDIME: SIGNIFICANT CHANGE UP
-  CEFTAZIDIME: SIGNIFICANT CHANGE UP
-  CEFTRIAXONE: SIGNIFICANT CHANGE UP
-  CEFTRIAXONE: SIGNIFICANT CHANGE UP
-  CIPROFLOXACIN: SIGNIFICANT CHANGE UP
-  CIPROFLOXACIN: SIGNIFICANT CHANGE UP
-  ERTAPENEM: SIGNIFICANT CHANGE UP
-  ERTAPENEM: SIGNIFICANT CHANGE UP
-  GENTAMICIN: SIGNIFICANT CHANGE UP
-  GENTAMICIN: SIGNIFICANT CHANGE UP
-  IMIPENEM: SIGNIFICANT CHANGE UP
-  IMIPENEM: SIGNIFICANT CHANGE UP
-  LEVOFLOXACIN: SIGNIFICANT CHANGE UP
-  LEVOFLOXACIN: SIGNIFICANT CHANGE UP
-  MEROPENEM: SIGNIFICANT CHANGE UP
-  MEROPENEM: SIGNIFICANT CHANGE UP
-  NITROFURANTOIN: SIGNIFICANT CHANGE UP
-  PIPERACILLIN/TAZOBACTAM: SIGNIFICANT CHANGE UP
-  PIPERACILLIN/TAZOBACTAM: SIGNIFICANT CHANGE UP
-  TOBRAMYCIN: SIGNIFICANT CHANGE UP
-  TOBRAMYCIN: SIGNIFICANT CHANGE UP
-  TRIMETHOPRIM/SULFAMETHOXAZOLE: SIGNIFICANT CHANGE UP
-  TRIMETHOPRIM/SULFAMETHOXAZOLE: SIGNIFICANT CHANGE UP
ANION GAP SERPL CALC-SCNC: 7 MMOL/L — SIGNIFICANT CHANGE UP (ref 5–17)
BUN SERPL-MCNC: 42 MG/DL — HIGH (ref 7–23)
CALCIUM SERPL-MCNC: 10.5 MG/DL — HIGH (ref 8.5–10.1)
CHLORIDE SERPL-SCNC: 104 MMOL/L — SIGNIFICANT CHANGE UP (ref 96–108)
CO2 SERPL-SCNC: 30 MMOL/L — SIGNIFICANT CHANGE UP (ref 22–31)
CREAT SERPL-MCNC: 1.14 MG/DL — SIGNIFICANT CHANGE UP (ref 0.5–1.3)
CULTURE RESULTS: SIGNIFICANT CHANGE UP
GLUCOSE SERPL-MCNC: 133 MG/DL — HIGH (ref 70–99)
HCT VFR BLD CALC: 39 % — SIGNIFICANT CHANGE UP (ref 34.5–45)
HGB BLD-MCNC: 12.7 G/DL — SIGNIFICANT CHANGE UP (ref 11.5–15.5)
MCHC RBC-ENTMCNC: 31 PG — SIGNIFICANT CHANGE UP (ref 27–34)
MCHC RBC-ENTMCNC: 32.6 GM/DL — SIGNIFICANT CHANGE UP (ref 32–36)
MCV RBC AUTO: 95.1 FL — SIGNIFICANT CHANGE UP (ref 80–100)
METHOD TYPE: SIGNIFICANT CHANGE UP
METHOD TYPE: SIGNIFICANT CHANGE UP
NT-PROBNP SERPL-SCNC: 8709 PG/ML — HIGH (ref 0–450)
ORGANISM # SPEC MICROSCOPIC CNT: SIGNIFICANT CHANGE UP
PLATELET # BLD AUTO: 279 K/UL — SIGNIFICANT CHANGE UP (ref 150–400)
POTASSIUM SERPL-MCNC: 4.1 MMOL/L — SIGNIFICANT CHANGE UP (ref 3.5–5.3)
POTASSIUM SERPL-SCNC: 4.1 MMOL/L — SIGNIFICANT CHANGE UP (ref 3.5–5.3)
RBC # BLD: 4.1 M/UL — SIGNIFICANT CHANGE UP (ref 3.8–5.2)
RBC # FLD: 13.9 % — SIGNIFICANT CHANGE UP (ref 10.3–14.5)
SODIUM SERPL-SCNC: 141 MMOL/L — SIGNIFICANT CHANGE UP (ref 135–145)
SPECIMEN SOURCE: SIGNIFICANT CHANGE UP
WBC # BLD: 20.7 K/UL — HIGH (ref 3.8–10.5)
WBC # FLD AUTO: 20.7 K/UL — HIGH (ref 3.8–10.5)

## 2017-05-22 PROCEDURE — 93306 TTE W/DOPPLER COMPLETE: CPT | Mod: 26

## 2017-05-22 RX ADMIN — Medication 40 MILLIGRAM(S): at 06:56

## 2017-05-22 RX ADMIN — Medication 20 MILLIEQUIVALENT(S): at 12:23

## 2017-05-22 RX ADMIN — PANTOPRAZOLE SODIUM 40 MILLIGRAM(S): 20 TABLET, DELAYED RELEASE ORAL at 12:23

## 2017-05-22 RX ADMIN — Medication 4 MILLIGRAM(S): at 12:23

## 2017-05-22 RX ADMIN — Medication 4 MILLIGRAM(S): at 02:30

## 2017-05-22 RX ADMIN — SIMVASTATIN 10 MILLIGRAM(S): 20 TABLET, FILM COATED ORAL at 21:08

## 2017-05-22 RX ADMIN — VALSARTAN 80 MILLIGRAM(S): 80 TABLET ORAL at 06:56

## 2017-05-22 RX ADMIN — CEFTRIAXONE 100 GRAM(S): 500 INJECTION, POWDER, FOR SOLUTION INTRAMUSCULAR; INTRAVENOUS at 12:23

## 2017-05-22 RX ADMIN — Medication 4 MILLIGRAM(S): at 17:27

## 2017-05-22 RX ADMIN — ATENOLOL 25 MILLIGRAM(S): 25 TABLET ORAL at 06:56

## 2017-05-22 RX ADMIN — Medication 4 MILLIGRAM(S): at 06:57

## 2017-05-22 NOTE — PROGRESS NOTE ADULT - SUBJECTIVE AND OBJECTIVE BOX
The patient is a 89 yo female a h/o AFib on eliquis, dementia, angina, HTN, anemia, incontinence, anxiety, hx of breast ca s/p mastectomy, admitted  with c/o SOB, nausea, vomiting and abd discomfort, here afebrile, wbc ct 19.4, UA grossly positive, blood cx growing GNRs, urine cx with Ecoli, CT abd/chest/pelvis with 1.1 cm right upper breast mass, b/l solid renal masses concerning for malignancy and changes c/w cystitis, pt was given IV cefepime/vanco.     no fevers  more awake/alert        MEDICATIONS  (STANDING):  simvastatin 10milliGRAM(s) Oral at bedtime  ATENolol  Tablet 25milliGRAM(s) Oral daily  potassium chloride    Tablet ER 20milliEquivalent(s) Oral daily  dexamethasone  Injectable 4milliGRAM(s) IV Push every 6 hours  pantoprazole  Injectable 40milliGRAM(s) IV Push daily  valsartan 80milliGRAM(s) Oral daily  furosemide   Injectable 40milliGRAM(s) IV Push daily  cefTRIAXone   IVPB  IV Intermittent   cefTRIAXone   IVPB 2Gram(s) IV Intermittent every 24 hours      Vital Signs Last 24 Hrs  T(C): 36.4, Max: 36.4 (- @ 21:20)  T(F): 97.5, Max: 97.5 (- @ 21:20)  HR: 77 (68 - 80)  BP: 133/50 (133/50 - 144/87)  BP(mean): --  RR: 16 (16 - 16)  SpO2: 100% (94% - 100%)              PE:  Constitutional: frail looking  HEENT: NC/AT, EOMI, PERRLA  Neck: supple  Back: no tenderness  Respiratory: clear  Cardiovascular: S1S2 regular, no murmurs  Abdomen: soft, not tender, not distended, positive BS  Genitourinary: deferred  Rectal: deferred  Musculoskeletal: no muscle tenderness, no joint swelling or tenderness  Extremities: no pedal edema  Neurological: no focal deficits  Skin: no rashes    Labs:                        12.7   20.7  )-----------( 279      ( 22 May 2017 06:25 )             39.0     05-22  Culture - Urine (17 @ 05:17)    -  Meropenem: S <=1    -  Tobramycin: S <=4    -  Amikacin: S <=16    -  Cefepime: S <=4    -  Ertapenem: S <=1    -  Imipenem: S <=1    -  Cefoxitin: I 16    -  Ceftazidime: S <=1    -  Gentamicin: S <=4    -  Nitrofurantoin: S <=32    -  Piperacillin/Tazobactam: S <=16    -  Ampicillin: R >16    -  Ampicillin/Sulbactam: S <=8/4    -  Aztreonam: S <=4    -  Cefazolin: R >16    -  Ceftriaxone: S <=1    -  Ciprofloxacin: S <=1    -  Levofloxacin: S <=2    -  Trimethoprim/Sulfamethoxazole: S <=2/38    Specimen Source: .Urine Catheterized    Culture Results:   50,000 - 99,000 CFU/mL Escherichia coli    Organism Identification: Escherichia coli    Organism: Escherichia coli    Method Type: ROGER      141  |  104  |  42<H>  ----------------------------<  133<H>  4.1   |  30  |  1.14    Ca    10.5<H>      22 May 2017 06:25                       Urinalysis Basic - ( 20 May 2017 05:17 )    Color: Yellow / Appearance: very cloudy / S.015 / pH: x  Gluc: x / Ketone: Negative  / Bili: Negative / Urobili: Negative mg/dL   Blood: x / Protein: 30 mg/dL / Nitrite: Positive   Leuk Esterase: Moderate / RBC: 3-5 /HPF / WBC TNTC /HPF   Sq Epi: x / Non Sq Epi: Moderate / Bacteria: Few      Culture - Blood (17 @ 06:01)    Gram Stain:   Growth in aerobic bottle: Gram Negative Rods    Specimen Source: .Blood None    Culture Results:   Growth in aerobic bottle: Escherichia coli  Susceptibility to follow.    Culture - Blood (17 @ 06:01)    Gram Stain:   Growth in aerobic and anaerobic bottles: Gram Negative Rods    Specimen Source: .Blood None    Culture Results:   Growth in aerobic and anaerobic bottles: Escherichia coli  See previous culture [id number]            Radiology:    Advanced directives addressed: full resuscitation

## 2017-05-22 NOTE — PROGRESS NOTE ADULT - ASSESSMENT
The patient is a 89 yo female a h/o AFib on eliquis, dementia, angina, HTN, anemia, incontinence, anxiety, hx of breast ca s/p mastectomy, admitted 5/20 with c/o SOB, nausea, vomiting and abd discomfort, here afebrile, wbc ct 19.4, UA grossly positive, blood cx growing GNRs, urine cx with Ecoli, CT abd/chest/pelvis with 1.1 cm right upper breast mass, b/l solid renal masses concerning for malignancy and changes c/w cystitis, pt was given IV cefepime/vanco.    1. Ecoli sepsis/UTI/CHF/immunocompromised host  -improving  -  IV rocephin [id number] day#2, completed 1 day of cefepime  -urine cx with Ecoli/blood cx with Ecoli  -repeat blood cx  - has renal masses and hx of breast ca and breast lesion concerning for malignancy  - f/u CBC  - supportive care  - monitor temps  -tolerating abx well so far; no side effects noted  -reason for abx use and side effects reviewed with patient    2. other issues- AFib on eliquis, dementia, angina, HTN, anemia, incontinence, anxiety - care per medicine

## 2017-05-22 NOTE — PROGRESS NOTE ADULT - ASSESSMENT
89 yo female a h/o AFib on eliquis, dementia, angina, HTN, anemia, incontinence, anxiety, h/o of prior brain tumor s/p cyber knife, h/o breast CA s/p mastectomy  admitted on 5/20  form the [facility] assisted living for SOB, nausea, vomiting and abd discomfort.  Found brain metastasis small hemorrhage, renal mass, Cystitis/UTI, CHF, elevated troponin.     Problem/Plan - 1:  ·  Problem: Acute systolic congestive heart failure.  Plan: telemetry,   systolic vs diastolic  contiue iv lasix BB, statins,  Diovan, cardiology consult appreciated  2 d echo.     Problem/Plan - 2:  ·  Problem: Urinary tract infection.  Plan: Continue IV rocephin as per ID.  + blood cx, + urine cx.     Problem/Plan - 3:  ·  Problem: Renal neoplasm.  Plan: observation only at her age as per Dr. Bolton.     Problem/Plan - 4:  ·  Problem: Brain metastases.  Plan: Obtained Neurosurgical consult from Dr. Cordoba. No Sx intervention.   CT head noted  -MRI brain -noted    Problem/Plan - 5:  ·  Problem: Atrial fibrillation.  Plan: Hold Eliquis because of brain bleed, Start anticoagulation if cleared by neurosurgery.     Problem/Plan - 6:  Problem: Advanced care planning/counseling discussion. Plan: discussed in length with grand son at bedside  DNR/DNI, form signed  if any changes family wants to call Aiyana [phone] cell, [phone].    Problem/Plan - 7:  ·  Problem: Elevated troponin I level.  Plan: borderline, likely due to demand ischemia from HF.     Problem/Plan - 8:  3 sec pause: cardio f/u. cont tele monitoring for now.     poc discussed with pt, her grandson, and team. 89 yo female a h/o AFib on eliquis, dementia, angina, HTN, anemia, incontinence, anxiety, h/o of prior brain tumor s/p cyber knife, h/o breast CA s/p mastectomy  admitted on 5/20  form the [facility] assisted living for SOB, nausea, vomiting and abd discomfort.  Found brain metastasis small hemorrhage, renal mass, Cystitis/UTI, CHF, elevated troponin.     Problem/Plan - 1:  ·  Problem: Acute systolic congestive heart failure.  Plan: telemetry,   systolic vs diastolic  contiue iv lasix BB, statins,  Diovan, cardiology consult appreciated  2 d echo.     Problem/Plan - 2:  ·  Problem: Urinary tract infection + sepsis with E coli, resolving Plan: Continue IV rocephin as per ID.  + blood cx, + urine cx.     Problem/Plan - 3:  ·  Problem: Renal neoplasm.  Plan: observation only at her age as per Dr. Bolton.     Problem/Plan - 4:  ·  Problem: Brain metastases.  Plan: Obtained Neurosurgical consult from Dr. Cordoba. No Sx intervention.   CT head noted  -MRI brain -noted    Problem/Plan - 5:  ·  Problem: Atrial fibrillation.  Plan: Hold Eliquis because of brain bleed, Start anticoagulation if cleared by neurosurgery.     Problem/Plan - 6:  Problem: Advanced care planning/counseling discussion. Plan: discussed in length with grand son at bedside  DNR/DNI, form signed  if any changes family wants to call Aiyana [phone] cell, [phone].    Problem/Plan - 7:  ·  Problem: Elevated troponin I level.  Plan: borderline, likely due to demand ischemia from HF.     Problem/Plan - 8:  3 sec pause: cardio f/u. cont tele monitoring for now.     poc discussed with pt, her grandson, and team.

## 2017-05-22 NOTE — CONSULT NOTE ADULT - PROBLEM SELECTOR RECOMMENDATION 9
At her age and physical condition these are best observed.  Will be happy to go over this with any interested family members

## 2017-05-22 NOTE — PROGRESS NOTE ADULT - ASSESSMENT
89 year old woman with h/o dementia, prior brain tumor s/p cyber knife and breast ca s/p mastectomy who was admitted with Acute Systolic Congestive Heart Failure and UTI was found to have Bilateral solid renal masses and left frontal lobe lesion with surrounding vasogenic edema. 89 year old woman with h/o dementia, prior brain tumor s/p cyber knife and breast ca s/p mastectomy who was admitted with Acute Systolic Congestive Heart Failure and UTI was found to have bilateral solid renal masses and left frontal lobe lesion with surrounding vasogenic edema as well as a sella mass involving the  right cavernous sinus and skull base. There apears to be surgical defect from previous transphenoidal surgery, exact history unknown given pt has dementia.    PLAN-  No surgical intervention at this time.  Pt has a possible left frontal lobe met vs meningioma with a possible met vs pituitary adenoma  Need to acquire further details of prior brain pathology  Check endocrine status if there is a history of pituitary adenoma

## 2017-05-22 NOTE — CDI QUERY NOTE - NSCDIOTHERTXTBX_GEN_ALL_CORE_HH
Please clarify if Sepsis is Present ? Resolving ? Resolved ? or Ruled-out ?  On admit Temp= 101, HR= 103, WBC= 19.4, lactate= 1.3  Culture - Blood (05.20.17 @ 06:01)    Gram Stain: Growth in aerobic and anaerobic bottles: Gram Negative Rods   Urine culture= 50,000- 90,000 E. Coli  Patient on Rocephin IV qday.  GNR Sepsis documented by infectious disease.    Please clarify ... Patient with GNR Sepsis now resolving ?   ... Sepsis ruled-out ? Patient with UTI and bacteremia, no evidence of sepsis ? Other ? Please clarify

## 2017-05-22 NOTE — PROGRESS NOTE ADULT - SUBJECTIVE AND OBJECTIVE BOX
PRIOR, LYDIA    89y     Female     MR#[id number]    HPI:  The patient is a 89 yo female a h/o AFib on eliquis, dementia, angina, HTN, anemia, incontinence, anxiety ,who was sent to the ER from the [facility] for SOB, nausea, vomiting and abd discomfort. Pt found to have left front lobe lesion with vasogenic edema.    Vital Signs Last 24 Hrs  T(C): 36.4, Max: 36.4 (05-21 @ 21:20)  T(F): 97.5, Max: 97.5 (05-21 @ 21:20)  HR: 68 (68 - 81)  BP: 142/75 (142/75 - 144/87)  BP(mean): --  RR: 16 (15 - 16)  SpO2: 100% (94% - 100%)    PHYSICAL EXAM:      MEDS:  MEDICATIONS  (STANDING):  simvastatin 10milliGRAM(s) Oral at bedtime  ATENolol  Tablet 25milliGRAM(s) Oral daily  potassium chloride    Tablet ER 20milliEquivalent(s) Oral daily  dexamethasone  Injectable 4milliGRAM(s) IV Push every 6 hours  pantoprazole  Injectable 40milliGRAM(s) IV Push daily  valsartan 80milliGRAM(s) Oral daily  furosemide   Injectable 40milliGRAM(s) IV Push daily  cefTRIAXone   IVPB  IV Intermittent   cefTRIAXone   IVPB 2Gram(s) IV Intermittent every 24 hours    LABS:                        12.7   20.7  )-----------( 279      ( 22 May 2017 06:25 )             39.0       05-22    141  |  104  |  42<H>  ----------------------------<  133<H>  4.1   |  30  |  1.14    Ca    10.5<H>      22 May 2017 06:25    IMAGING-  CT HEAD:  Vasogenic edema in the left frontal lobe with associated small foci of   high attenuation may represent hemorrhage, calcification, or small   underlyinglesion. No mass effect or midline shift. Primary consideration   is metastasis.    CT Chest/Abd/Pelvis  Bilateral solid renal masses indicative of neoplasm. No collecting system   or renal vascular invasion. 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 PRIOR, LYDIA    89y     Female     MR#[id number]    HPI:  The patient is a 89 yo female a h/o AFib on eliquis, dementia, angina, HTN, anemia, incontinence, anxiety ,who was sent to the ER from the [facility] for SOB, nausea, vomiting and abd discomfort. Pt found to have left front lobe lesion with vasogenic edema as well as a lesion in the sella eroding into the clivis. Pt also has a history of prior brain tumor- resection(?) and breast cancer     Vital Signs Last 24 Hrs  T(C): 36.4, Max: 36.4 (05-21 @ 21:20)  T(F): 97.5, Max: 97.5 (05-21 @ 21:20)  HR: 68 (68 - 81)  BP: 142/75 (142/75 - 144/87)  RR: 16 (15 - 16)  SpO2: 100% (94% - 100%)    PHYSICAL EXAM:  Constitutional: awake and alert, oriented to self and place   HEENT: PERRLA, EOMI  Neck: Supple.  Respiratory: Breath sounds are clear bilaterally  Cardiovascular: S1 and S2, regular  rhythm  Gastrointestinal: soft, nontender  Extremities: + b/l lower ext edema   Musculoskeletal: no back pain   Skin: No rashes    Neurological exam:  HF: awake and alert oriented to self and place, appropriately  interactive, speech intaact   CN: HAYDE, EOMI, VFF, facial sensation normal, no NLFD, tongue midline  Motor: No pronator drift, Strength 5/5 in all 4 ext, normal bulk and tone, no tremor, rigidity or bradykinesia.    Sens: Intact to light touch  Reflexes: Symmetric and normal . BJ 2+, BR 2+, KJ 2+, AJ 2+, downgoing toes b/l  Coord:  No FNFA  Gait/Balance: Not tested     MEDS:  MEDICATIONS  (STANDING):  simvastatin 10milliGRAM(s) Oral at bedtime  ATENolol  Tablet 25milliGRAM(s) Oral daily  potassium chloride    Tablet ER 20milliEquivalent(s) Oral daily  dexamethasone  Injectable 4milliGRAM(s) IV Push every 6 hours  pantoprazole  Injectable 40milliGRAM(s) IV Push daily  valsartan 80milliGRAM(s) Oral daily  furosemide   Injectable 40milliGRAM(s) IV Push daily  cefTRIAXone   IVPB  IV Intermittent   cefTRIAXone   IVPB 2Gram(s) IV Intermittent every 24 hours    LABS:                        12.7   20.7  )-----------( 279      ( 22 May 2017 06:25 )             39.0       05-22    141  |  104  |  42<H>  ----------------------------<  133<H>  4.1   |  30  |  1.14    Ca    10.5<H>      22 May 2017 06:25    IMAGING-  CT HEAD:  Vasogenic edema in the left frontal lobe with associated small foci of   high attenuation may represent hemorrhage, calcification, or small   underlying lesion. No mass effect or midline shift. Primary consideration   is metastasis.    CT Chest/Abd/Pelvis  Bilateral solid renal masses indicative of neoplasm. No collecting system   or renal vascular invasion. 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    MRI:  1. A solid and cystic lesion is present in the anterior inferior left   frontal region as described. Leading considerations include a meningioma   versus a metastasis (given the patient's clinical history). Other mass   lesions can't be excluded.    2. An invasive heterogeneously enhancing lesion involves the sella, right   cavernous sinus, and skull base. Some considerations include an invasive   pituitary macroadenoma, metastasis, or invasive meningioma. A defect   involves the nasal septum raising possibility of prior transsphenoidal   surgery.

## 2017-05-22 NOTE — PROGRESS NOTE ADULT - SUBJECTIVE AND OBJECTIVE BOX
HPI:  The patient is a 89 yo female a h/o AFib on eliquis, dementia, angina, HTN, anemia, incontinence, anxiety, h/o of prior brain tumor s/p cyber knife, h/o breast CA s/p mastectomy  admitted on   form the [facility] assisted living for SOB, nausea, vomiting and abd discomfort.     - Patient seen and examined at bedside earlier today, hard to hear, poor historian, denies pain, more awake and alert, poor PO intake, family at bedside, plan of care discus    :  feeling better  3.01 sec pause, had 2 sec pause yesterday      PHYSICAL EXAM:    Daily     Daily Weight in k.1 (22 May 2017 07:36)    ICU Vital Signs Last 24 Hrs  T(C): 36.4, Max: 36.4 ( @ 21:20)  T(F): 97.5, Max: 97.5 ( @ 21:20)  HR: 77 (68 - 80)  BP: 133/50 (133/50 - 144/87)  BP(mean): --  ABP: --  ABP(mean): --  RR: 16 (16 - 16)  SpO2: 100% (94% - 100%)      Constitutional: Weak appearing  HEENT: Atraumatic, MARCK, Normal, No congestion  Respiratory: Breath Sounds normal, no rhonchi/wheeze  Cardiovascular: N S1S2; SANTANA present  Gastrointestinal: Abdomen soft, non tender, Bowel Sounds present  Extremities: No edema, peripheral pulses present  Neurological: AAO x 1, no gross focal motor deficits  Skin: Non cellulitic, no rash, ulcers  Lymph Nodes: No lymphadenopathy noted  Back: No CVA tenderness   Musculoskeletal: non tender  Breasts: Deferred  Genitourinary: deferred  Rectal: Deferred                          12.7   20.7  )-----------( 279      ( 22 May 2017 06:25 )             39.0       CBC Full  -  ( 22 May 2017 06:25 )  WBC Count : 20.7 K/uL  Hemoglobin : 12.7 g/dL  Hematocrit : 39.0 %  Platelet Count - Automated : 279 K/uL  Mean Cell Volume : 95.1 fl  Mean Cell Hemoglobin : 31.0 pg  Mean Cell Hemoglobin Concentration : 32.6 gm/dL  Auto Neutrophil # : x  Auto Lymphocyte # : x  Auto Monocyte # : x  Auto Eosinophil # : x  Auto Basophil # : x  Auto Neutrophil % : x  Auto Lymphocyte % : x  Auto Monocyte % : x  Auto Eosinophil % : x  Auto Basophil % : x      05-    141  |  104  |  42<H>  ----------------------------<  133<H>  4.1   |  30  |  1.14    Ca    10.5<H>      22 May 2017 06:25                              MEDICATIONS  (STANDING):  simvastatin 10milliGRAM(s) Oral at bedtime  ATENolol  Tablet 25milliGRAM(s) Oral daily  potassium chloride    Tablet ER 20milliEquivalent(s) Oral daily  dexamethasone  Injectable 4milliGRAM(s) IV Push every 6 hours  pantoprazole  Injectable 40milliGRAM(s) IV Push daily  valsartan 80milliGRAM(s) Oral daily  furosemide   Injectable 40milliGRAM(s) IV Push daily  cefTRIAXone   IVPB  IV Intermittent   cefTRIAXone   IVPB 2Gram(s) IV Intermittent every 24 hours

## 2017-05-23 LAB
ANION GAP SERPL CALC-SCNC: 11 MMOL/L — SIGNIFICANT CHANGE UP (ref 5–17)
BUN SERPL-MCNC: 45 MG/DL — HIGH (ref 7–23)
CALCIUM SERPL-MCNC: 10.3 MG/DL — HIGH (ref 8.5–10.1)
CHLORIDE SERPL-SCNC: 103 MMOL/L — SIGNIFICANT CHANGE UP (ref 96–108)
CO2 SERPL-SCNC: 28 MMOL/L — SIGNIFICANT CHANGE UP (ref 22–31)
CREAT SERPL-MCNC: 1.05 MG/DL — SIGNIFICANT CHANGE UP (ref 0.5–1.3)
GLUCOSE SERPL-MCNC: 123 MG/DL — HIGH (ref 70–99)
HCT VFR BLD CALC: 37.7 % — SIGNIFICANT CHANGE UP (ref 34.5–45)
HGB BLD-MCNC: 12.6 G/DL — SIGNIFICANT CHANGE UP (ref 11.5–15.5)
MCHC RBC-ENTMCNC: 31.4 PG — SIGNIFICANT CHANGE UP (ref 27–34)
MCHC RBC-ENTMCNC: 33.4 GM/DL — SIGNIFICANT CHANGE UP (ref 32–36)
MCV RBC AUTO: 93.8 FL — SIGNIFICANT CHANGE UP (ref 80–100)
PLATELET # BLD AUTO: 291 K/UL — SIGNIFICANT CHANGE UP (ref 150–400)
POTASSIUM SERPL-MCNC: 3.9 MMOL/L — SIGNIFICANT CHANGE UP (ref 3.5–5.3)
POTASSIUM SERPL-SCNC: 3.9 MMOL/L — SIGNIFICANT CHANGE UP (ref 3.5–5.3)
RBC # BLD: 4.02 M/UL — SIGNIFICANT CHANGE UP (ref 3.8–5.2)
RBC # FLD: 13.6 % — SIGNIFICANT CHANGE UP (ref 10.3–14.5)
SODIUM SERPL-SCNC: 142 MMOL/L — SIGNIFICANT CHANGE UP (ref 135–145)
WBC # BLD: 13.8 K/UL — HIGH (ref 3.8–10.5)
WBC # FLD AUTO: 13.8 K/UL — HIGH (ref 3.8–10.5)

## 2017-05-23 RX ORDER — FUROSEMIDE 40 MG
20 TABLET ORAL DAILY
Qty: 0 | Refills: 0 | Status: DISCONTINUED | OUTPATIENT
Start: 2017-05-23 | End: 2017-05-24

## 2017-05-23 RX ORDER — PANTOPRAZOLE SODIUM 20 MG/1
40 TABLET, DELAYED RELEASE ORAL
Qty: 0 | Refills: 0 | Status: DISCONTINUED | OUTPATIENT
Start: 2017-05-23 | End: 2017-05-24

## 2017-05-23 RX ADMIN — SIMVASTATIN 10 MILLIGRAM(S): 20 TABLET, FILM COATED ORAL at 21:31

## 2017-05-23 RX ADMIN — ATENOLOL 25 MILLIGRAM(S): 25 TABLET ORAL at 06:52

## 2017-05-23 RX ADMIN — Medication 4 MILLIGRAM(S): at 00:10

## 2017-05-23 RX ADMIN — CEFTRIAXONE 100 GRAM(S): 500 INJECTION, POWDER, FOR SOLUTION INTRAMUSCULAR; INTRAVENOUS at 11:57

## 2017-05-23 RX ADMIN — VALSARTAN 80 MILLIGRAM(S): 80 TABLET ORAL at 06:52

## 2017-05-23 RX ADMIN — Medication 4 MILLIGRAM(S): at 07:03

## 2017-05-23 RX ADMIN — Medication 40 MILLIGRAM(S): at 07:03

## 2017-05-23 RX ADMIN — Medication 20 MILLIEQUIVALENT(S): at 11:56

## 2017-05-23 RX ADMIN — Medication 4 MILLIGRAM(S): at 17:46

## 2017-05-23 RX ADMIN — Medication 4 MILLIGRAM(S): at 11:56

## 2017-05-23 RX ADMIN — PANTOPRAZOLE SODIUM 40 MILLIGRAM(S): 20 TABLET, DELAYED RELEASE ORAL at 11:56

## 2017-05-23 NOTE — PROGRESS NOTE ADULT - SUBJECTIVE AND OBJECTIVE BOX
Cardiology Progress Note:    HPI: The patient is a 89 yo female a h/o AFib on eliquis, dementia, angina, HTN, anemia, incontinence, anxiety,who was sent to the ER form the [facility] assisted living for SOB, nausea, vomiting and abd discomfort. The patient is not able to give Hx. secondary to dementia, [place]. Transfer records reviewed. The patient is in bed , alert, disoriented is not c/o pain. (20 May 2017 11:24).    5/23- No CP/SOB. No fevers. [place]. Confused. Afib on tele 70-80s.    PAST MEDICAL & SURGICAL HISTORY:  H/O total knee replacement: 15 YEARS AGO  H/O mastectomy, left: 30 YEARS AGO    Allergies  No Known Allergies    SOCIAL HISTORY: Denies tobacco, etoh abuse or illicit drug use    FAMILY HISTORY: Noncontributory    MEDICATIONS  (STANDING):  simvastatin 10milliGRAM(s) Oral at bedtime  ATENolol  Tablet 25milliGRAM(s) Oral daily  potassium chloride    Tablet ER 20milliEquivalent(s) Oral daily  dexamethasone  Injectable 4milliGRAM(s) IV Push every 6 hours  pantoprazole  Injectable 40milliGRAM(s) IV Push daily  valsartan 80milliGRAM(s) Oral daily  furosemide   Injectable 40milliGRAM(s) IV Push daily  cefTRIAXone   IVPB  IV Intermittent   cefTRIAXone   IVPB 2Gram(s) IV Intermittent every 24 hours    MEDICATIONS  (PRN):    Vital Signs Last 24 Hrs  T(C): 36.3, Max: 36.7 (05-22 @ 20:20)  T(F): 97.3, Max: 98 (05-22 @ 20:20)  HR: 77 (68 - 78)  BP: 160/75 (132/79 - 160/75)  BP(mean): --  RR: 16 (16 - 16)  SpO2: 94% (94% - 100%)    REVIEW OF SYSTEMS:    CONSTITUTIONAL:  As per HPI.  HEENT:  Eyes:  No diplopia or blurred vision. ENT:  No earache, sore throat or runny nose.  CARDIOVASCULAR:  No pressure, squeezing, strangling, tightness, heaviness or aching about the chest, neck, axilla or epigastrium.  RESPIRATORY:  No cough, shortness of breath, PND or orthopnea.  GASTROINTESTINAL:  No nausea, vomiting or diarrhea.  GENITOURINARY:  No dysuria, frequency or urgency.  MUSCULOSKELETAL:  As per HPI.  SKIN:  No change in skin, hair or nails.  NEUROLOGIC:  No paresthesias, fasciculations, seizures or weakness.  PSYCHIATRIC:  No disorder of thought or mood.  ENDOCRINE:  No heat or cold intolerance, polyuria or polydipsia.  HEMATOLOGICAL:  No easy bruising or bleedings.     PHYSICAL EXAMINATION:    GENERAL APPEARANCE:  Pt. is not currently dyspneic, in no distress. Pt. is alert, oriented, and pleasant.  HEENT:  Pupils are normal and react normally. No icterus. Mucous membranes well colored.  NECK:  Supple. No lymphadenopathy. Jugular venous pressure not elevated. Carotids equal.   HEART:   The cardiac impulse has a normal quality. There are no murmurs, rubs or gallops noted  CHEST:  Chest is clear to auscultation. Normal respiratory effort.  ABDOMEN:  Soft and nontender.   EXTREMITIES:  There is no edema.     LABS:                        12.6   13.8  )-----------( 291      ( 23 May 2017 06:20 )             37.7     05-23    142  |  103  |  45<H>  ----------------------------<  123<H>  3.9   |  28  |  1.05    Ca    10.3<H>      23 May 2017 06:20    EKG: Atrial fibrillation  Nonspecific T wave abnormality    TELEMETRY: Afib    CARDIAC TESTS:  The left ventricle is normal in size, wall motion and contractility.   Mild concentric left ventricular hypertrophy is present.   Estimated left ventricular ejection fraction is 60-65 %.   The left atrium is mildly dilated.   Fibrocalcific changes noted to the aortic valve leaflets with preserved   excursion.   Mild (1+) mitral regurgitation is present.   Moderate to severe (3+) tricuspid valve regurgitation is present.   Severe pulmonary hypertension.  is was notified.    RADIOLOGY & ADDITIONAL STUDIES: MRI brain- 1. A solid and cystic lesion is present in the anterior inferior left   frontal region as described. Leading considerations include a meningioma   versus a metastasis (given the patient's clinical history). Other mass   lesions can't be excluded.  2. An invasive heterogeneously enhancing lesion involves the sella, right   cavernous sinus, and skull base. Some considerations include an invasive   pituitary macroadenoma, metastasis, or invasive meningioma. A defect   involves the nasal septum raising possibility of prior transsphenoidal   surgery.    ASSESSMENT & PLAN:

## 2017-05-23 NOTE — GOALS OF CARE CONVERSATION - PERSONAL ADVANCE DIRECTIVE - CONVERSATION DETAILS
The role of Palliative Medicine was reviewed. The pt's son Ayan discussed her disease progression and her current cancer diagnosis. They would not want to pursue any aggressive work up and or treatment. He would like her to return to [facility] with Hospice services through [facility] if possible. DNR/DNI in place

## 2017-05-23 NOTE — PROGRESS NOTE ADULT - SUBJECTIVE AND OBJECTIVE BOX
HPI:  The patient is a 89 yo female a h/o AFib on eliquis, dementia, angina, HTN, anemia, incontinence, anxiety, h/o of prior brain tumor s/p cyber knife, h/o breast CA s/p mastectomy  admitted on 5/20  form the [facility] assisted living for SOB, nausea, vomiting and abd discomfort.    5/21 - Patient seen and examined at bedside earlier today, hard to hear, poor historian, denies pain, more awake and alert, poor PO intake, family at bedside, plan of care discus    5/22:  feeling better  3.01 sec pause, had 2 sec pause yesterday    5/23:  no complaints      PHYSICAL EXAM:    Daily     Daily     ICU Vital Signs Last 24 Hrs  T(C): 36.8, Max: 36.8 (05-23 @ 10:34)  T(F): 98.3, Max: 98.3 (05-23 @ 10:34)  HR: 81 (77 - 81)  BP: 160/78 (132/79 - 160/78)  BP(mean): --  ABP: --  ABP(mean): --  RR: 16 (16 - 16)  SpO2: 94% (94% - 100%)      Constitutional: Weak appearing  HEENT: Atraumatic, MARCK, Normal, No congestion  Respiratory: Breath Sounds normal, no rhonchi/wheeze  Cardiovascular: N S1S2; SANTANA present  Gastrointestinal: Abdomen soft, non tender, Bowel Sounds present  Extremities: No edema, peripheral pulses present  Neurological: AAO x 3, no gross focal motor deficits  Skin: Non cellulitic, no rash, ulcers  Lymph Nodes: No lymphadenopathy noted  Back: No CVA tenderness   Musculoskeletal: non tender  Breasts: Deferred  Genitourinary: deferred  Rectal: Deferred                          12.6   13.8  )-----------( 291      ( 23 May 2017 06:20 )             37.7       CBC Full  -  ( 23 May 2017 06:20 )  WBC Count : 13.8 K/uL  Hemoglobin : 12.6 g/dL  Hematocrit : 37.7 %  Platelet Count - Automated : 291 K/uL  Mean Cell Volume : 93.8 fl  Mean Cell Hemoglobin : 31.4 pg  Mean Cell Hemoglobin Concentration : 33.4 gm/dL  Auto Neutrophil # : x  Auto Lymphocyte # : x  Auto Monocyte # : x  Auto Eosinophil # : x  Auto Basophil # : x  Auto Neutrophil % : x  Auto Lymphocyte % : x  Auto Monocyte % : x  Auto Eosinophil % : x  Auto Basophil % : x      05-23    142  |  103  |  45<H>  ----------------------------<  123<H>  3.9   |  28  |  1.05    Ca    10.3<H>      23 May 2017 06:20                              MEDICATIONS  (STANDING):  simvastatin 10milliGRAM(s) Oral at bedtime  ATENolol  Tablet 25milliGRAM(s) Oral daily  potassium chloride    Tablet ER 20milliEquivalent(s) Oral daily  dexamethasone  Injectable 4milliGRAM(s) IV Push every 6 hours  pantoprazole  Injectable 40milliGRAM(s) IV Push daily  valsartan 80milliGRAM(s) Oral daily  furosemide   Injectable 40milliGRAM(s) IV Push daily  cefTRIAXone   IVPB  IV Intermittent   cefTRIAXone   IVPB 2Gram(s) IV Intermittent every 24 hours

## 2017-05-23 NOTE — PROGRESS NOTE ADULT - ASSESSMENT
89 year old woman with h/o dementia, prior brain tumor s/p cyber knife and breast ca s/p mastectomy who was admitted with Acute Systolic Congestive Heart Failure and UTI was found to have bilateral solid renal masses and left frontal lobe lesion with surrounding vasogenic edema as well as a sella mass involving the  right cavernous sinus and skull base. There apears to be surgical defect from previous transphenoidal surgery, exact history unknown given pt has dementia.    After discussion with Dr. 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    Recommendations:  No neurosurgical intervention   Endocrine work up: Prolactin, FSH, ACTH, LH, Resting AM cortisol, TSH, Free T3, Estrogen, IGLF  Agree with palliative care consult  Re-starting anticoagulation would put the patient at risk for cerebral hemorrhage-cardiology to determine risk vs benefit of restarting an anticoagulant in a 89 year old with essentially stage four renal cancer and a risk of falls

## 2017-05-23 NOTE — PROGRESS NOTE ADULT - SUBJECTIVE AND OBJECTIVE BOX
PRIOR, LYDIA    89y     Female     MR#[id number]    HPI:    The patient is a 89 yo female a h/o AFib on eliquis, dementia, angina, HTN, anemia, incontinence, anxiety ,who was sent to the ER from the [facility] for SOB, nausea, vomiting and abd discomfort. Pt found to have left front lobe lesion with vasogenic edema as well as a lesion in the sella eroding into the clivis. Pt also has a history of prior brain tumor- resection(?) and breast cancer     5/23- Pt seen and examined, denies headache, continued confusion, A-fib on monitor    Vital Signs Last 24 Hrs  T(C): 36.8, Max: 36.8 (05-23 @ 10:34)  T(F): 98.3, Max: 98.3 (05-23 @ 10:34)  HR: 81 (77 - 81)  BP: 160/78 (132/79 - 160/78)  RR: 16 (16 - 16)  SpO2: 94% (94% - 100%)    PHYSICAL EXAM:  Constitutional: awake and alert, oriented to self and place   HEENT: PERRLA, EOMI  Neck: Supple.  Respiratory: Breath sounds are clear bilaterally  Cardiovascular: +S1 and S2  Gastrointestinal: soft, nontender  Extremities: mild lower ext edema   Musculoskeletal: no back pain   Skin: No rashes    Neurological exam:  HF: awake and alert oriented to self and place, appropriately  interactive, speech intact     CN: HAYDE, EOMI, VFF, facial sensation normal, no NLFD, tongue midline  Motor: No pronator drift, Strength 5/5 in all 4 ext, normal bulk and tone, no tremor, rigidity or bradykinesia.    Sens: Intact to light touch  Reflexes: Symmetric and normal . BJ 2+, BR 2+, KJ 2+, AJ 2+, downgoing toes b/l  Coord:  No FNFA  Gait/Balance: Not tested     MEDS:  MEDICATIONS  (STANDING):  simvastatin 10milliGRAM(s) Oral at bedtime  ATENolol  Tablet 25milliGRAM(s) Oral daily  potassium chloride    Tablet ER 20milliEquivalent(s) Oral daily  dexamethasone  Injectable 4milliGRAM(s) IV Push every 6 hours  pantoprazole  Injectable 40milliGRAM(s) IV Push daily  valsartan 80milliGRAM(s) Oral daily  furosemide   Injectable 40milliGRAM(s) IV Push daily  cefTRIAXone   IVPB 2Gram(s) IV Intermittent every 24 hours      LABS:                        12.6   13.8  )-----------( 291      ( 23 May 2017 06:20 )             37.7       05-23    142  |  103  |  45<H>  ----------------------------<  123<H>  3.9   |  28  |  1.05    Ca    10.3<H>      23 May 2017 06:20    IMAGING-  MRI Brain:  1. A solid and cystic lesion is present in the anterior inferior left   frontal region as described. Leading considerations include a meningioma   versus a metastasis (given the patient's clinical history). Other mass   lesions can't be excluded.  2. An invasive heterogeneously enhancing lesion involves the sella, right   cavernous sinus, and skull base. Some considerations include an invasive   pituitary macroadenoma, metastasis, or invasive meningioma. A defect   involves the nasal septum raising possibility of prior transsphenoidal   surgery.

## 2017-05-23 NOTE — PROGRESS NOTE ADULT - SUBJECTIVE AND OBJECTIVE BOX
HPI: Pt is a 89y old Female with hx of  AFib on eliquis, dementia, angina, HTN, anemia, incontinence and anxiety who was sent to the ER form the [facility] living for SOB, nausea, vomiting and abd discomfort. Hosp course reveals  brain metastasis with a small hemorrhage, renal mass, Cystitis/UTI, CHF as well as an elevated troponin.   89 y/o female with a h/o AFib on eliquis, dementia, angina, HTN, anemia, incontinence, anxiety, who was sent to the ER from the [facility], found with brain metastasis small hemorrhage, renal mass, Cistitis/UTI, CHF, elevated troponin.    5/21/17 Seen and examined at bedside with no family present. Awake and able to state that she is in the hospital otherwise confused. Denies pain or dyspnea. Unable to give history  5/23/17 Seen and examined at bedside with no family present. Denies any pain or dyspnea    PAIN: ( )Yes   (X )No    DYSPNEA: ( ) Yes  (X ) No  Level:    PAST MEDICAL & SURGICAL HISTORY:  H/O total knee replacement: 15 YEARS AGO  H/O mastectomy, left: 30 YEARS AGO      SOCIAL HX:    Lives in care home  Hx opiate tolerance ( )YES  ( X)NO    Baseline ADLs  (Prior to Admission)  ( ) Independent   (X )Dependent    FAMILY HISTORY:      Review of Systems:    Anxiety-  Depression-  Physical Discomfort-  Dyspnea-  Constipation-  Diarrhea-  Nausea-  Vomiting-  Anorexia-  Weight Loss-   Cough-  Secretions-  Fatigue-  Weakness-  Delirium-    All other systems reviewed and negative  Unable to obtain/Limited due to: confusion /dementia      PHYSICAL EXAM:      ICU Vital Signs Last 24 Hrs  T(C): 36.8, Max: 36.8 (05-23 @ 10:34)  T(F): 98.3, Max: 98.3 (05-23 @ 10:34)  HR: 81 (77 - 81)  BP: 160/78 (132/79 - 160/78)  BP(mean): --  ABP: --  ABP(mean): --  RR: 16 (16 - 16)  SpO2: 94% (94% - 100%)    PPSV2:  40 %  FAST: 6    General: Alert elderly female in bed in NAD  Mental Status: Awake disoriented to time. Able to follow commands   HEENT: NC AT  Lungs:  clear to ascultation fannie  Cardiac: S1S2+  GI: Abd soft NT ND + BS  : Voids  Ext: HARGROVE=Strength  Neuro:      LABS:                                   12.6   13.8  )-----------( 291      ( 23 May 2017 06:20 )             37.7             05-23    142  |  103  |  45<H>  ----------------------------<  123<H>  3.9   |  28  |  1.05    Ca    10.3<H>      23 May 2017 06:20           PTT - ( 20 May 2017 06:01 )  PTT:37.9 sec  Albumin: Albumin, Serum: 3.2 g/dL (05-20 @ 06:01)      Allergies    No Known Allergies      RADIOLOGY/ADDITIONAL STUDIES:      PROCEDURE DATE:  05/20/2017        INTERPRETATION:  CT ABDOMEN AND PELVIS IC    HISTORY:  abd pain, nausea, vomiting, dementia    Technique: CT of the abdomen and pelvis is performed without oral with   intravenous contrast. Axial images are supplemented with coronal and   sagittal reformations. This study was performed using automatic exposure   control (radiation dose reduction software) to obtain a diagnostic image   quality scan with patient dose as low as reasonably achievable.    Contrast: 90 cc Omnipaque 350    Comparison: None.    Findings:  LIVER: Normal.  SPLEEN: Normal.  PANCREAS: Normal.  GALLBLADDER/BILIARY TREE: Nondilated. Gallstones.  ADRENALS: Mild bilateral thickening without a discrete nodule.    KIDNEYS: No urinary tract calculi or hydronephrosis. Bilateral enhancing   renal masses indicated. Neoplasm. The largest lesion in the medial   posterior left mid pole measures 3.5 x 3.2 cm and demonstrates extension   into the left perinephric fat. The lesion in the right lower pole   measures 2.2 cm. Small lesion in the posterior right upper pole measures   0.9 cm. No collecting system or renal vascular invasion.    LYMPHADENOPATHY/RETROPERITONEUM: No adenopathy. Enhancing left   retroperitoneal mass anterior to the left psoas muscle measures 2.6 x 2.5   cm (3; 75) is worrisome for a retroperitoneal tumor deposit.    VASCULATURE: Severe aortoiliac atherosclerosis without aneurysm.  BOWEL: No bowel related abnormality. Large hiatal hernia containing a   nearly intrathoracic stomach. No bowel obstruction.    PELVIC VISCERA: Thickening of the uterine endometrium up to 10 mm. No   adnexal mass. Diffuse urinary bladder wall thickening and perivesical   stranding.    PELVIC LYMPH NODES: No pelvic adenopathy.  PERITONEUM/ABDOMINAL WALL: No free air, ascites, or peritoneal implants.  SKELETAL: No lytic or blastic osseous lesion.  LUNG BASES: Please refer to separately reported CTA chest.    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 No collecting system   or renal vascular invasion. 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    Thickening of the uterine endometrium up to 10 mm. Tissue sampling is   advised.    Large hiatal hernia containing a nearly intrathoracic stomach. No bowel   obstruction.    Diffuse urinary bladder wall thickening and perivesical stranding   indicative of cystitis.    These results were communicated to Dr. Renee by me over telephone at   9:00 AM on 5/20/2017.      EXAM:  CT ANGIO CHEST PE PROTOCOL IC                            PROCEDURE DATE:  05/20/2017        INTERPRETATION:  CT ANGIO CHEST PE PROTOCOL IC    HISTORY:  diffuculty breathing, nausea, vomiting, hypoxic    TECHNIQUE: Contrast enhanced CT pulmonary angiogram was performed.   Multiplanar CT and HRCT images were reviewed. Maximum intensity   projection (MIP) images are reconstructed as per CT angiography protocol.   Images were acquired during the administration of 90 cc Omnipaque 350 IV   contrast. This study was performed using automatic exposure control   (radiation dose reduction software) to obtain a diagnostic image quality   scan with patient dose as low as reasonably achievable.    COMPARISON: Chest x-ray 2 hours prior    PULMONARYARTERIES: No pulmonary embolism in the visualized segments.   Limited evaluation of lingular and left lower lobe segmental and   subsegmental branches secondary to respiratory motion.    HEART AND VESSELS: Mild cardiomegaly. No pericardial effusion. Coronary   artery calcifications are noted.    MEDIASTINUM, HAYLIE, AXILLAE: No adenopathy.    LUNGS, AIRWAYS: The central airways are patent. There is mild pulmonary   edema and bibasilar subsegmental atelectasis. Scattered granulomas are   noted.    PLEURA: Trace bilateral pleural effusions.    UPPER ABDOMEN: Please refer to separately reported CT abdomen and pelvis.    BONES AND CHEST WALL: 1.1 cm soft tissue nodule in the medial upper right   breast is indeterminate (2; 31). Status post left mastectomy. No   aggressive bony lesion.    IMPRESSION:     No pulmonary embolism in the visualized segments. Limited evaluation of   the lingular and left lower lobe subsegmental and segmental branches   secondary to respiratory motion.    Cardiomegaly,pulmonary edema, and trace bilateral pleural effusions.    1.1 cm soft tissue nodule in the medial upper right breast, for which   follow-up dedicated mammographic imaging is advised.

## 2017-05-23 NOTE — PROGRESS NOTE ADULT - ASSESSMENT
Pt is a 89y old Female with hx of  AFib on eliquis, dementia, angina, HTN, anemia, incontinence and anxiety who was sent to the ER form the [facility] assisted living for SOB, nausea, vomiting and abd discomfort. Hosp course reveals  brain metastasis with a small hemorrhage, renal mass, Cystitis/UTI, CHF as well as an elevated troponin.   89 y/o female with a h/o AFib on eliquis, dementia, angina, HTN, anemia, incontinence, anxiety, who was sent to the ER from the [facility], found with brain metastasis small hemorrhage, renal mass, Cistitis/UTI, CHF, elevated troponin.    Assessment and Plan:    1) Nausea/Vomiting  -Resolved  -R/T brain mets with vasogenic edema  -Cont steroids as per neuro  -CT head noted  -MRI brain noted  2) Renal Neoplasm with mets  -Urology eval pending  -CT adb/pelvis noted  -Brain metastasis  -Neurosurg eval noted  -Oncology eval pending  3) Bacteremia  -Pos bld Culture  -UTI  -ABX as per medicine  -ID eval pending  4) Heart failure  -Cardiology eval noted  -Chronic heart failure  -AFib  -AC on hold   -Cont BB  5) Advance Directives  -Pt without capacity  -TANISHA Fink Prior named HCP  -Ayan and Aiyana Joel alternates  -DNR/DNI in place  -Had a GOC discussion with son Ayan who would like pt to return to [facility] with hospice support from [facility] if possible. They would not want any aggressive treatment. We will confirm with Aleks Joel HCP 5/24/17

## 2017-05-23 NOTE — PROGRESS NOTE ADULT - ASSESSMENT
89F w/ extensive hx p/w diastolic HF exacerbation.     1. Diastolic HF- Lying flat, no signs/symptoms of fluid overload at this time. Will need some PO lasix with TR/PHTN. Will change to lasix 20mg daily. 2Decho- report above. Cont medical mgmt with Bbl/ACEi.    2. Anticoagulation- Eliquis held in setting of brain mets/hemorrhage. Await neurosurgery recs.    3.  UTI/urosepsis- cont abx, cx pending.     4. DVT proph. Replete lytes as needed.

## 2017-05-23 NOTE — PROVIDER CONTACT NOTE (OTHER) - NAME OF MD/NP/PA/DO NOTIFIED:
Neurosurgery
Called Palliative care. Left voice message on machine
Dr. Terrance Bolton
RE-Called Dr Russ's office. Spoke with Gema
Dr. A. Sacksberg

## 2017-05-23 NOTE — PROGRESS NOTE ADULT - ASSESSMENT
89 yo female a h/o AFib on eliquis, dementia, angina, HTN, anemia, incontinence, anxiety, h/o of prior brain tumor s/p cyber knife, h/o breast CA s/p mastectomy  admitted on 5/20  form the [facility] assisted living for SOB, nausea, vomiting and abd discomfort.Found to have brain metastasis, small hemorrhage, renal mass, Cystitis/UTI, CHF, elevated troponin.     Problem/Plan - 1:  ·  Problem: Acute Diastolic congestive heart failure.  Plan: telemetry,   diastolic  switch to po lasix , cont BB, statins,  Diovan;  cardiology consult appreciated  2 d echo: EF 60-65%    Problem/Plan - 2:  ·  Problem: Urinary tract infection + sepsis with E coli, resolving Plan: Continue IV rocephin as per ID.  + blood cx, + urine cx.     Problem/Plan - 3:  ·  Problem: Renal neoplasm.  Plan: observation only at her age as per Dr. Bolton.     Problem/Plan - 4:  ·  Problem: Brain metastases.  Plan: Obtained Neurosurgical consult from Dr. Cordoba. No Sx intervention.   CT head noted  -MRI brain -noted    Problem/Plan - 5:  ·  Problem: Atrial fibrillation.  Plan: Hold Eliquis because of brain bleed, neurosurgery sees no point in starting  anticoagulation. Rest as per cardio.     Problem/Plan - 6:  Problem: Advanced care planning/counseling discussion. Plan: discussed in length with grand son at bedside  DNR/DNI, form signed  if any changes family wants to call Aiyana [phone] cell, [phone].    Problem/Plan - 7:  ·  Problem: Elevated troponin I level.  Plan: borderline, likely due to demand ischemia from HF.     Problem/Plan - 8:  3 sec pause: cardio f/u. no acute intervention. cont tele monitoring for now.     poc discussed with pt, team, Dr. Tinsley.

## 2017-05-24 VITALS
RESPIRATION RATE: 18 BRPM | HEART RATE: 68 BPM | DIASTOLIC BLOOD PRESSURE: 66 MMHG | TEMPERATURE: 97 F | SYSTOLIC BLOOD PRESSURE: 134 MMHG | OXYGEN SATURATION: 95 %

## 2017-05-24 RX ORDER — CEFUROXIME AXETIL 250 MG
1 TABLET ORAL
Qty: 0 | Refills: 0 | COMMUNITY
Start: 2017-05-24

## 2017-05-24 RX ORDER — AMLODIPINE BESYLATE 2.5 MG/1
5 TABLET ORAL DAILY
Qty: 0 | Refills: 0 | Status: DISCONTINUED | OUTPATIENT
Start: 2017-05-24 | End: 2017-05-24

## 2017-05-24 RX ORDER — APIXABAN 2.5 MG/1
5 TABLET, FILM COATED ORAL
Qty: 0 | Refills: 0 | COMMUNITY

## 2017-05-24 RX ORDER — POTASSIUM CHLORIDE 20 MEQ
2 PACKET (EA) ORAL
Qty: 0 | Refills: 0 | COMMUNITY

## 2017-05-24 RX ORDER — AMLODIPINE BESYLATE 2.5 MG/1
5 TABLET ORAL ONCE
Qty: 0 | Refills: 0 | Status: COMPLETED | OUTPATIENT
Start: 2017-05-24 | End: 2017-05-24

## 2017-05-24 RX ORDER — CEFUROXIME AXETIL 250 MG
500 TABLET ORAL EVERY 12 HOURS
Qty: 0 | Refills: 0 | Status: DISCONTINUED | OUTPATIENT
Start: 2017-05-24 | End: 2017-05-24

## 2017-05-24 RX ORDER — CEFUROXIME AXETIL 250 MG
1 TABLET ORAL
Qty: 16 | Refills: 0 | OUTPATIENT
Start: 2017-05-24 | End: 2017-06-01

## 2017-05-24 RX ADMIN — VALSARTAN 80 MILLIGRAM(S): 80 TABLET ORAL at 05:59

## 2017-05-24 RX ADMIN — Medication 500 MILLIGRAM(S): at 13:46

## 2017-05-24 RX ADMIN — Medication 4 MILLIGRAM(S): at 05:58

## 2017-05-24 RX ADMIN — Medication 4 MILLIGRAM(S): at 11:31

## 2017-05-24 RX ADMIN — PANTOPRAZOLE SODIUM 40 MILLIGRAM(S): 20 TABLET, DELAYED RELEASE ORAL at 05:58

## 2017-05-24 RX ADMIN — Medication 4 MILLIGRAM(S): at 00:05

## 2017-05-24 RX ADMIN — Medication 20 MILLIGRAM(S): at 05:59

## 2017-05-24 RX ADMIN — AMLODIPINE BESYLATE 5 MILLIGRAM(S): 2.5 TABLET ORAL at 11:31

## 2017-05-24 RX ADMIN — ATENOLOL 25 MILLIGRAM(S): 25 TABLET ORAL at 05:59

## 2017-05-24 NOTE — DISCHARGE NOTE ADULT - HOSPITAL COURSE
89 yo female a h/o AFib on eliquis, dementia, angina, HTN, anemia, incontinence, anxiety, h/o of prior brain tumor s/p cyber knife, h/o breast CA s/p mastectomy  admitted on 5/20  form the [facility] assisted living for SOB, nausea, vomiting and abd discomfort.Found to have brain metastasis, small hemorrhage, renal mass, Cystitis/UTI, CHF, elevated troponin.     Problem/Plan - 1:  ·  Problem: Acute Diastolic congestive heart failure.  Plan: switch to po lasix , cont BB, statins,  Diovan;  cardiology consult appreciated. Better.   2 d echo: EF 60-65%    Problem/Plan - 2:  ·  Problem: Urinary tract infection + sepsis with E coli, resolving Plan: treated with IV rocephin as per ID.; switch to ceftin 500 mg po q 12 hrs x 16 doses.  + blood cx, + urine cx.     Problem/Plan - 3:  ·  Problem: Renal neoplasm.  Plan: observation only at her age as per Dr. Bolton.     Problem/Plan - 4:  ·  Problem: Brain metastases + ICH.  Plan: Obtained Neurosurgical consult from Dr. Cordoba. No Sx intervention.   CT head noted  -MRI brain -noted  - Hold eliquis    Problem/Plan - 5:  ·  Problem: Atrial fibrillation.  Plan: Hold Eliquis because of brain bleed, neurosurgery sees no point in starting  anticoagulation. Rest as per cardio.     Problem/Plan - 6:  Problem: Advanced care planning/counseling discussion. Plan: discussed in length with grand son at bedside  DNR/DNI, form signed  if any changes family wants to call Aiyana [phone] cell, [phone].    Problem/Plan - 7:  ·  Problem: Elevated troponin I level.  Plan: borderline, likely due to demand ischemia from HF.     Problem/Plan - 8:  3 sec pause: cardio f/u. no acute intervention. Pt id DNR/DNI, comfort care.    Dispo: d/c to [facility] with hospice in place. Comfort care.   Prognosis: Grave.     poc discussed with pt, team,, pt's son, Aleks Saunders. 89 yo female a h/o AFib on eliquis, dementia, angina, HTN, anemia, incontinence, anxiety, h/o of prior brain tumor s/p cyber knife, h/o breast CA s/p mastectomy  admitted on 5/20  form the [facility] assisted living for SOB, nausea, vomiting and abd discomfort.Found to have brain metastasis, small hemorrhage, renal mass, Cystitis/UTI, CHF, elevated troponin.     Problem/Plan - 1:  ·  Problem: Acute Diastolic congestive heart failure.  Plan: switch to po lasix , cont BB, statins,  Diovan;  cardiology consult appreciated. Better.   2 d echo: EF 60-65%    Problem/Plan - 2:  ·  Problem: Urinary tract infection + sepsis with E coli, resolving Plan: treated with IV rocephin as per ID.; switch to ceftin 500 mg po q 12 hrs x 16 doses.  + blood cx, + urine cx.     Problem/Plan - 3:  ·  Problem: Renal neoplasm.  Plan: observation only at her age as per Dr. Bolton.     Problem/Plan - 4:  ·  Problem: Brain metastases + ICH.  Plan: Obtained Neurosurgical consult from Dr. Cordoba. No Sx intervention.   CT head noted  -MRI brain -noted  - Hold eliquis    Problem/Plan - 5:  ·  Problem: Atrial fibrillation.  Plan: Hold Eliquis because of brain bleed, neurosurgery sees no point in starting  anticoagulation. Rest as per cardio.     Problem/Plan - 6:  Problem: Advanced care planning/counseling discussion. Plan: discussed in length with grand son at bedside  DNR/DNI, form signed  if any changes family wants to call Aiyana [phone] cell, [phone].    Problem/Plan - 7:  ·  Problem: Elevated troponin I level.  Plan: borderline, likely due to demand ischemia from HF.     Problem/Plan - 8:  3 sec pause: cardio f/u. no acute intervention. Pt id DNR/DNI, comfort care.    Dispo: d/c to [facility] with hospice in place. Comfort care.   Prognosis: Grave.     poc discussed with pt, team,, pt's son, Aleks Saunders.  time spent 45 min

## 2017-05-24 NOTE — DISCHARGE NOTE ADULT - MEDICATION SUMMARY - MEDICATIONS TO TAKE
I will START or STAY ON the medications listed below when I get home from the hospital:    Cymbalta 30 mg oral delayed release capsule  -- 1 cap(s) by mouth once a day  -- Indication: For depression    simvastatin 10 mg oral tablet  -- 1 tab(s) by mouth once a day (at bedtime)  -- Indication: For Hld    Exforge 5 mg-160 mg oral tablet  -- 1 tab(s) by mouth once a day  -- Indication: For Htn    atenolol 25 mg oral tablet  -- 1 tab(s) by mouth once a day  -- Indication: For Htn    cefuroxime 500 mg oral tablet  -- 1 tab(s) by mouth every 12 hours  -- Indication: For Uti    Lasix 40 mg oral tablet  -- 1 tab(s) by mouth once a day  -- Indication: For chf    ascorbic acid 500 mg oral tablet  -- 1 tab(s) by mouth once a day  -- Indication: For Suppl    folic acid 1 mg oral tablet  -- 1 tab(s) by mouth once a day  -- Indication: For Suppl

## 2017-05-24 NOTE — CHART NOTE - NSCHARTNOTEFT_GEN_A_CORE
HPI:  The patient is a 89 yo female a h/o AFib on eliquis, dementia, angina, HTN, anemia, incontinence, anxiety,who was sent to the ER form the [facility] assisted living for SOB, nausea, vomiting and abd discomfort.    PERTINENT PMH REVIEWED:  [ X] YES [ ] NO           Primary Contact: Ayan Joel                   Relationship:   Son                      HCP/Surrogate: Alternate HCP                Phone Number: [phone] Home/ [phone] CELL     Pts primary HCP is son CHRISTINE Joel who is out of town and will be back tomorrow, alternate HCP are Aiyana Joel (dtr in law) who this SW spoke with over the phone and gave contact information to speak with her  the other alternate HCP Ayan    HCP [ X ] Surrogate [   ] Guardian [   ]    Mental Status: [ ] Alert  [  ] Oriented [  ] Confused [ X ] Lethargic [  ]  Baseline ADLs (prior to admission):  Independent [ ] moderately [ ] fully   Dependent   [ X ] moderately [ ]fully    Family Meeting attendees:  Demarco Botello alternate HCP  Doug Roland NP      Anticipated Grief: Patient[  ] Family [ X ]      Goals of Care: Comfort [  ] Rehabilitation [  ] Curative [  ] Life Prolonging [  ]    Previous Services: [facility]    ADVANCE DIRECTIVES:  [ X ] YES [ ] NO   DNR [ X ] YES [ ] NO  DNI [ X ] YES [ ] NO                    MOLST  [ ] YES [ X ] NO      Anticipated D/C Plan: Back to [facility] with [facility] if [facility] will accept.                    Summary:    Team spoke with pts son Ayan over the phone to discuss goals of care, assist with planning and provide supportive counseling.    Role of Palliative Medicine was reviewed as well as pts current medical condition. Pt's son Ayan discussed her disease progression and her current cancer diagnosis. Pts son reports that he and his family members including pts other HCPs would not want to pursue any aggressive work up and or treatment. Pts family would like to focus on pts comfort.    Team discussed possible option of pt. returning to [facility] with Hospice services through [facility] if [facility] is willing to accept. Pts son agreeable to this plan and reports his brother CHRISTINE Fink will be in tomorrow and will confirm plan with us.  DNR/DNI in place.    [facility] made aware of plan and will check with [facility] to see if they will be willing to take pt. back to hospice services. Emotional support provided to the family. Our team will continue to follow.
Upon Nutritional Assessment by the Registered Dietitian your patient was determined to meet criteria has evidence of the following diagnosis/diagnoses:        [X]  Mild Protein Calorie Malnutrition        [ ]  Moderate Protein Calorie Malnutrition        [ ] Severe Protein Calorie Malnutrition        [ ] Unspecified Protein Calorie Malnutrition    Findings as based on:  •  Comprehensive nutrition assessment and Nutrition focused physical examination  •  Insufficient food/energy intake  •  Weight loss over time(Please specify time period)  •  Loss of muscle mass  •  Loss of fat mass  •  Fluid accumulation    Findings relevant to patient:  1. <75% PO intake > 7 days  2. Mild +1 right/left leg fluid accumulation  3 Recent dx of brain metastasis with a small hemorrhage    Nutrition Interventions:  1. Will add 4 oz ensure enlive TID  2. Provide full assistance with meals as needed due to dementia  3.    TO BE COMPLETED BY PROVIDER:          [ ] Agree:         [ ] Disagree:    Comments:

## 2017-05-24 NOTE — CONSULT NOTE ADULT - CONSULT REQUESTED DATE/TIME
20-May-2017 14:59
21-May-2017 10:53
21-May-2017 11:51
22-May-2017 10:49
24-May-2017 09:28
20-May-2017 12:07

## 2017-05-24 NOTE — CONSULT NOTE ADULT - ASSESSMENT
89 year old female with known Dementia presented with Nausea and vomiting , work up revealed Bilateral  Renal masses , retroperitoneal Tumor deposit and multiple Brain Lesions with associated Vasogenic Edema with minimal mass effect . Stable
89 yo female with bilateral renal masses, possible retroperitoneal node, hemorrhagic brain lesion; dementia, poor performance status.      A/P    Agree with palliative care  ? RT evaluation  Consider anti-epileptic given h/o hemorrhagic brain met and increased risk for seizure  Will follow  PRN
Acute decompensated HF- continue iv diuresis with lasix.  Close monitoring of electrolytes and vitals.    Afib- continue beta blocker      HTN- continue outpt meds.    Other medical issues- nausea.  Thank you for allowing me to participate in the care of this patient. Please feel free to contact me with any questions.
The patient is a 89 yo female a h/o AFib on eliquis, dementia, angina, HTN, anemia, incontinence, anxiety, hx of breast ca s/p mastectomy, admitted 5/20 with c/o SOB, nausea, vomiting and abd discomfort, here afebrile, wbc ct 19.4, UA grossly positive, blood cx growing GNRs, urine cx with Ecoli, CT abd/chest/pelvis with 1.1 cm right upper breast mass, b/l solid renal masses concerning for malignancy and changes c/w cystitis, pt was given IV cefepime/vanco.    1. GNR sepsis/Ecoli UTI/CHF/immunocompromised host    - d/c IV cefepime, switch to IV rocephin [id number]  - f/u id/senstivities of blood cx/urine cx  - urine cx growing E coli  - repeat blood in am  - has renal masses and hx of breast ca and breast lesion concerning for malignancy  - f/u CBC  - supportive care  - monitor temps  -tolerating abx well so far; no side effects noted  -reason for abx use and side effects reviewed with patient    2. other issues- AFib on eliquis, dementia, angina, HTN, anemia, incontinence, anxiety - care per medicine
Pt is a 89y old Female with hx of  AFib on eliquis, dementia, angina, HTN, anemia, incontinence and anxiety who was sent to the ER form the [facility] assisted living for SOB, nausea, vomiting and abd discomfort. Hosp course reveals  brain metastasis with a small hemorrhage, renal mass, Cystitis/UTI, CHF as well as an elevated troponin.   89 y/o female with a h/o AFib on eliquis, dementia, angina, HTN, anemia, incontinence, anxiety, who was sent to the ER from the [facility] assisted living, found with brain metastasis small hemorrhage, renal mass, Cistitis/UTI, CHF, elevated troponin.    Assessment and Plan:    1) Nausea/Vomiting  -Resolved  -R/T brain mets with vasogenic edema  -Cont steroids  -CT head noted  -MRI brain pending  2) Renal Neoplasm with mets  -Urology eval pending  -CT adb/pelvis noted  -Brain metastasis  -Neurosurg eval noted  3) Bacteremia  -Pos bld Culture  -UTI  -Pos urine Culture  -ABX as per medicine  -ID eval pending  4) Heart failure  Cardiology eval noted  -Chronic heart failure  -AFib  -AC on hold   -Cont BB  5) Advance Directives  -Pt without capacity  -TANISHA Fink Prior named HCP  -Mary Joel alternates  -DNR/DNI in place  -Will schedule GOC discussion when medical W/U complete

## 2017-05-24 NOTE — DISCHARGE NOTE ADULT - PATIENT PORTAL LINK FT
“You can access the [facility] Patient Portal, offered by [facility], by registering with the following website: [email or web address]”

## 2017-05-24 NOTE — DISCHARGE NOTE ADULT - MEDICATION SUMMARY - MEDICATIONS TO STOP TAKING
I will STOP taking the medications listed below when I get home from the hospital:    Eliquis 5 mg oral tablet  -- 5 tab(s) by mouth 2 times a day

## 2017-05-24 NOTE — DISCHARGE NOTE ADULT - CARE PLAN
Principal Discharge DX:	Acute on chronic diastolic congestive heart failure  Goal:	better  Instructions for follow-up, activity and diet:	d/c to hospice; f/u with pcp/cardio

## 2017-05-24 NOTE — PROGRESS NOTE ADULT - ASSESSMENT
The patient is a 89 yo female a h/o AFib on eliquis, dementia, angina, HTN, anemia, incontinence, anxiety, hx of breast ca s/p mastectomy, admitted 5/20 with c/o SOB, nausea, vomiting and abd discomfort, here afebrile, wbc ct 19.4, UA grossly positive, blood cx growing GNRs, urine cx with Ecoli, CT abd/chest/pelvis with 1.1 cm right upper breast mass, b/l solid renal masses concerning for malignancy and changes c/w cystitis, pt was given IV cefepime/vanco.    1. Ecoli sepsis/UTI/CHF/immunocompromised host  -improving  -  IV rocephin [id number] day#4, completed 1 day of cefepime  -urine cx with Ecoli/blood cx with Ecoli  -repeat blood cx no growth 5/22  -switch to oral ceftin 500mg q12h to complete 8 days for total 10 day course (day from negative culture)  - has renal masses and hx of breast ca and breast lesion concerning for malignancy  - f/u CBC  - supportive care  - monitor temps  -tolerating abx well so far; no side effects noted  -reason for abx use and side effects reviewed with patient    2. other issues- AFib on eliquis, dementia, angina, HTN, anemia, incontinence, anxiety - care per medicine

## 2017-05-24 NOTE — DIETITIAN INITIAL EVALUATION ADULT. - NS AS NUTRI DX NUTRIENT
Malnutrition/Patient meets criteria for mild protein energy malnutrition due to  <75% PO intake > 7 days, mild +1 right/left leg fluid accumulation and recent dx of brain metastasis with a small hemorrhage

## 2017-05-24 NOTE — CONSULT NOTE ADULT - SUBJECTIVE AND OBJECTIVE BOX
HPI: Pt is a 89y old Female with hx of  AFib on eliquis, dementia, angina, HTN, anemia, incontinence and anxiety who was sent to the ER form the [facility] assisted living for SOB, nausea, vomiting and abd discomfort. Hosp course reveals  brain metastasis with a small hemorrhage, renal mass, Cystitis/UTI, CHF as well as an elevated troponin.   89 y/o female with a h/o AFib on eliquis, dementia, angina, HTN, anemia, incontinence, anxiety, who was sent to the ER from the [facility], found with brain metastasis small hemorrhage, renal mass, Cistitis/UTI, CHF, elevated troponin.    17 Seen and examined at bedside with no family present. Awake and able to state that she is in the hospital otherwise confused. Denies pain or dyspnea. Unable to give history    PAIN: ( )Yes   (X )No  Level:  Location:  Intensity:    /10  Quality:  Aggravating Factors:  Alleviating Factors:  Radiation:  Duration/Timing:  Impact on ADLs:    DYSPNEA: ( ) Yes  (X ) No  Level:    PAST MEDICAL & SURGICAL HISTORY:  H/O total knee replacement: 15 YEARS AGO  H/O mastectomy, left: 30 YEARS AGO      SOCIAL HX:    Lives in [place]  Hx opiate tolerance ( )YES  ( X)NO    Baseline ADLs  (Prior to Admission)  ( ) Independent   (X )Dependent    FAMILY HISTORY:      Review of Systems:    Anxiety-  Depression-  Physical Discomfort-  Dyspnea-  Constipation-  Diarrhea-  Nausea-  Vomiting-  Anorexia-  Weight Loss-   Cough-  Secretions-  Fatigue-  Weakness-  Delirium-    All other systems reviewed and negative  Unable to obtain/Limited due to: condusion/dementia      PHYSICAL EXAM:    Vital Signs Last 24 Hrs  T(C): 36.2, Max: 37.2 (05-20 @ 13:24)  T(F): 97.1, Max: 99 (05-20 @ 13:24)  HR: 81 (76 - 100)  BP: 140/76 (104/58 - 154/82)  BP(mean): --  RR: 17 (3 - 36)  SpO2: 100% (92% - 100%)  Daily     Daily Weight in k.1 (21 May 2017 07:39)    PPSV2:  40 %  FAST: 6    General: Alert elderly female in bed in NAD  Mental Status: Awake disoriented to time. Able to follow commands   HEENT: NNC AT  Lungs:  Cardiac:  GI:  :  Ext:  Neuro:      LABS:                        12.4   20.0  )-----------( 238      ( 20 May 2017 12:18 )             37.6         140  |  105  |  18  ----------------------------<  98  4.2   |  24  |  0.91    Ca    9.1      20 May 2017 12:18  Mg     2.0         TPro  7.0  /  Alb  3.2<L>  /  TBili  0.9  /  DBili  x   /  AST  15  /  ALT  20  /  AlkPhos  99      PT/INR - ( 20 May 2017 06:01 )   PT: 16.1 sec;   INR: 1.48 ratio         PTT - ( 20 May 2017 06:01 )  PTT:37.9 sec  Albumin: Albumin, Serum: 3.2 g/dL ( @ 06:01)      Allergies    No Known Allergies    Intolerances      MEDICATIONS  (STANDING):  simvastatin 10milliGRAM(s) Oral at bedtime  ATENolol  Tablet 25milliGRAM(s) Oral daily  potassium chloride    Tablet ER 20milliEquivalent(s) Oral daily  dexamethasone  Injectable 4milliGRAM(s) IV Push every 6 hours  pantoprazole  Injectable 40milliGRAM(s) IV Push daily  valsartan 80milliGRAM(s) Oral daily  furosemide   Injectable 40milliGRAM(s) IV Push daily  cefepime  IVPB 1000milliGRAM(s) IV Intermittent every 12 hours    MEDICATIONS  (PRN):      RADIOLOGY/ADDITIONAL STUDIES:      PROCEDURE DATE:  2017        INTERPRETATION:  CT ABDOMEN AND PELVIS IC    HISTORY:  abd pain, nausea, vomiting, dementia    Technique: CT of the abdomen and pelvis is performed without oral with   intravenous contrast. Axial images are supplemented with coronal and   sagittal reformations. This study was performed using automatic exposure   control (radiation dose reduction software) to obtain a diagnostic image   quality scan with patient dose as low as reasonably achievable.    Contrast: 90 cc Omnipaque 350    Comparison: None.    Findings:  LIVER: Normal.  SPLEEN: Normal.  PANCREAS: Normal.  GALLBLADDER/BILIARY TREE: Nondilated. Gallstones.  ADRENALS: Mild bilateral thickening without a discrete nodule.    KIDNEYS: No urinary tract calculi or hydronephrosis. Bilateral enhancing   renal masses indicated. Neoplasm. The largest lesion in the medial   posterior left mid pole measures 3.5 x 3.2 cm and demonstrates extension   into the left perinephric fat. The lesion in the right lower pole   measures 2.2 cm. Small lesion in the posterior right upper pole measures   0.9 cm. No collecting system or renal vascular invasion.    LYMPHADENOPATHY/RETROPERITONEUM: No adenopathy. Enhancing left   retroperitoneal mass anterior to the left psoas muscle measures 2.6 x 2.5   cm (3; 75) is worrisome for a retroperitoneal tumor deposit.    VASCULATURE: Severe aortoiliac atherosclerosis without aneurysm.  BOWEL: No bowel related abnormality. Large hiatal hernia containing a   nearly intrathoracic stomach. No bowel obstruction.    PELVIC VISCERA: Thickening of the uterine endometrium up to 10 mm. No   adnexal mass. Diffuse urinary bladder wall thickening and perivesical   stranding.    PELVIC LYMPH NODES: No pelvic adenopathy.  PERITONEUM/ABDOMINAL WALL: No free air, ascites, or peritoneal implants.  SKELETAL: No lytic or blastic osseous lesion.  LUNG BASES: Please refer to separately reported CTA chest.    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 No collecting system   or renal vascular invasion. 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    Thickening of the uterine endometrium up to 10 mm. Tissue sampling is   advised.    Large hiatal hernia containing a nearly intrathoracic stomach. No bowel   obstruction.    Diffuse urinary bladder wall thickening and perivesical stranding   indicative of cystitis.    These results were communicated to Dr. Renee by me over telephone at   9:00 AM on 2017.      EXAM:  CT ANGIO CHEST PE PROTOCOL IC                            PROCEDURE DATE:  2017        INTERPRETATION:  CT ANGIO CHEST PE PROTOCOL IC    HISTORY:  diffuculty breathing, nausea, vomiting, hypoxic    TECHNIQUE: Contrast enhanced CT pulmonary angiogram was performed.   Multiplanar CT and HRCT images were reviewed. Maximum intensity   projection (MIP) images are reconstructed as per CT angiography protocol.   Images were acquired during the administration of 90 cc Omnipaque 350 IV   contrast. This study was performed using automatic exposure control   (radiation dose reduction software) to obtain a diagnostic image quality   scan with patient dose as low as reasonably achievable.    COMPARISON: Chest x-ray 2 hours prior    PULMONARYARTERIES: No pulmonary embolism in the visualized segments.   Limited evaluation of lingular and left lower lobe segmental and   subsegmental branches secondary to respiratory motion.    HEART AND VESSELS: Mild cardiomegaly. No pericardial effusion. Coronary   artery calcifications are noted.    MEDIASTINUM, HAYLIE, AXILLAE: No adenopathy.    LUNGS, AIRWAYS: The central airways are patent. There is mild pulmonary   edema and bibasilar subsegmental atelectasis. Scattered granulomas are   noted.    PLEURA: Trace bilateral pleural effusions.    UPPER ABDOMEN: Please refer to separately reported CT abdomen and pelvis.    BONES AND CHEST WALL: 1.1 cm soft tissue nodule in the medial upper right   breast is indeterminate (2; 31). Status post left mastectomy. No   aggressive bony lesion.    IMPRESSION:     No pulmonary embolism in the visualized segments. Limited evaluation of   the lingular and left lower lobe subsegmental and segmental branches   secondary to respiratory motion.    Cardiomegaly,pulmonary edema, and trace bilateral pleural effusions.    1.1 cm soft tissue nodule in the medial upper right breast, for which   follow-up dedicated mammographic imaging is advised.
Patient is a 89y old  Female who presents with a chief complaint of     HPI:  This is a 89 y/o female with a Past Medical History significant for a baseline Dementia she is unable to provide any history . History  is obtained from NH paper work and chart h/o AFib on Eliquis , dementia, angina, HTN, anemia, incontinence, anxiety. she is sent from [facility] for SOB, nausea, vomiting and abd discomfort since last night . Work up revealed Multiple lesions on a non contrast Brain CT , also identified Bilateral Renal Masses consistent with neoplasm as well as Retroperitoneal tumor deposit measuring 2.6 cm . We are called to evaluate. She is seen and examined in the ED , no family at the bedside to provide history. 		    MEDICATIONS  (STANDING):  simvastatin 10milliGRAM(s) Oral at bedtime  ATENolol  Tablet 25milliGRAM(s) Oral daily  potassium chloride    Tablet ER 20milliEquivalent(s) Oral daily  furosemide    Tablet 40milliGRAM(s) Oral daily    Vital Signs Last 24 Hrs  T(C): 37.2, Max: 38.4 (05-20 @ 06:43)  T(F): 99, Max: 101.1 (05-20 @ 06:43)  HR: 83 (83 - 103)  BP: 134/67 (124/56 - 151/93)  BP(mean): --  RR: 22 (16 - 30)  SpO2: 98% (89% - 98%)    PHYSICAL EXAM:    Pt is seen in the ED sitting up in bed complaining of feeling cold , she is hard of hearing and unable to report events that precipitated her presentation to the ED  Obese Female   Extremities:  1+ pitting edema   Neuro:     Mental status:   The patient is alert, attentive, and oriented to self, only   Speech is clear and fluent   Fund of knowledge, is limited due to Dementia   Mood:  Confused affect  Cranial nerves:  CN II: Visual fields are full to confrontation.   CN III, IV, and VI: At primary gaze, there is no eye deviation, No ptosis.   CN V: Facial sensation is intact to light touch   CN VII: Face is symmetric with normal eye closure and smile.  CN VII: Hard of Hearing   CN IX, X: Palate elevates symmetrically. Phonation is normal.  CN XI: Head turning and shoulder shrug are intact  CN XII: Tongue is midline with normal movements and no atrophy.  Motor:   No Pronator drift  Normal Muscle bulk and tone   Strength is full bilaterally. No Noted pill rolling, No Tremor  Deltoids & Biceps, Triceps 5/5 bilaterally    / Wrist extension / flex 5/5 bilaterally  Iliopsoas, Hamstrings & Quads 5/5 bilaterally   Anterior Tibialis & Gastrocnemius 5/5 bilaterally   Dorsiflexion & Plantar Flex 5/5 bilaterally  Sensory: intact to light touch  Gait:  is not tested                           13.8   19.4  )-----------( 298      ( 20 May 2017 06:01 )             41.9    05-20    141  |  103  |  19  ----------------------------<  133<H>  3.9   |  28  |  0.98    Ca    9.6      20 May 2017 06:01  Mg     2.0     -20    TPro  7.0  /  Alb  3.2<L>  /  TBili  0.9  /  DBili  x   /  AST  15  /  ALT  20  /  AlkPhos  99  05-20      PT/INR - ( 20 May 2017 06:01 )   PT: 16.1 sec;   INR: 1.48 ratio         PTT - ( 20 May 2017 06:01 )  PTT:37.9 sec    Urinalysis Basic - ( 20 May 2017 05:17 )    Color: Yellow / Appearance: very cloudy / S.015 / pH: x  Gluc: x / Ketone: Negative  / Bili: Negative / Urobili: Negative mg/dL   Blood: x / Protein: 30 mg/dL / Nitrite: Positive   Leuk Esterase: Moderate / RBC: 3-5 /HPF / WBC TNTC /HPF   Sq Epi: x / Non Sq Epi: Moderate / Bacteria: Few    Imaging:   EXAM:  CT ABDOMEN AND PELVIS IC                          PROCEDURE DATE:  2017        INTERPRETATION:  CT ABDOMEN AND PELVIS IC    HISTORY:  abd pain, nausea, vomiting, dementia    Technique: CT of the abdomen and pelvis is performed without oral with   intravenous contrast. Axial images are supplemented with coronal and   sagittal reformations. This study was performed using automatic exposure   control (radiation dose reduction software) to obtain a diagnostic image   quality scan with patient dose as low as reasonably achievable.    Contrast: 90 cc Omnipaque 350    Comparison: None.    Findings:  LIVER: Normal.  SPLEEN: Normal.  PANCREAS: Normal.  GALLBLADDER/BILIARY TREE: Nondilated. Gallstones.  ADRENALS: Mild bilateral thickening without a discrete nodule.    KIDNEYS: No urinary tract calculi or hydronephrosis. Bilateral enhancing   renal masses indicated. Neoplasm. The largest lesion in the medial   posterior left mid pole measures 3.5 x 3.2 cm and demonstrates extension   into the left perinephric fat. The lesion in the right lower pole   measures 2.2 cm. Small lesion in the posterior right upper pole measures   0.9 cm. No collecting system or renal vascular invasion.    LYMPHADENOPATHY/RETROPERITONEUM: No adenopathy. Enhancing left   retroperitoneal mass anterior to the left psoas muscle measures 2.6 x 2.5   cm (3; 75) is worrisome for a retroperitoneal tumor deposit.    VASCULATURE: Severe aortoiliac atherosclerosis without aneurysm.  BOWEL: No bowel related abnormality. Large hiatal hernia containing a   nearly intrathoracic stomach. No bowel obstruction.    PELVIC VISCERA: Thickening of the uterine endometrium up to 10 mm. No   adnexal mass. Diffuse urinary bladder wall thickening and perivesical   stranding.    PELVIC LYMPH NODES: No pelvic adenopathy.  PERITONEUM/ABDOMINAL WALL: No free air, ascites, or peritoneal implants.  SKELETAL: No lytic or blastic osseous lesion.  LUNG BASES: Please refer to separately reported CTA chest.    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 No collecting system   or renal vascular invasion. 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    Thickening of the uterine endometrium up to 10 mm. Tissue sampling is   advised.    Large hiatal hernia containing a nearly intrathoracic stomach. No bowel   obstruction.    Diffuse urinary bladder wall thickening and perivesical stranding   indicative of cystitis.    These results were communicated to Dr. Renee by me over telephone at   9:00 AM on 2017.    EXAM:  CT ANGIO CHEST PE PROTOCOL IC                          PROCEDURE DATE:  2017        INTERPRETATION:  CT ANGIO CHEST PE PROTOCOL IC    HISTORY:  difficulty breathing, nausea, vomiting, hypoxic    TECHNIQUE: Contrast enhanced CT pulmonary angiogram was performed.   Multiplanar CT and HRCT images were reviewed. Maximum intensity   projection (MIP) images are reconstructed as per CT angiography protocol.   Images were acquired during the administration of 90 cc Omnipaque 350 IV   contrast. This study was performed using automatic exposure control   (radiation dose reduction software) to obtain a diagnostic image quality   scan with patient dose as low as reasonably achievable.    COMPARISON: Chest x-ray 2 hours prior    PULMONARY ARTERIES: No pulmonary embolism in the visualized segments.   Limited evaluation of lingular and left lower lobe segmental and   subsegmental branches secondary to respiratory motion.    HEART AND VESSELS: Mild cardiomegaly. No pericardial effusion. Coronary   artery calcifications are noted.    MEDIASTINUM, HAYLIE, AXILLAE: No adenopathy.    LUNGS, AIRWAYS: The central airways are patent. There is mild pulmonary   edema and bibasilar subsegmental atelectasis. Scattered granulomas are   noted.    PLEURA: Trace bilateral pleural effusions.    UPPER ABDOMEN: Please refer to separately reported CT abdomen and pelvis.    BONES AND CHEST WALL: 1.1 cm soft tissue nodule in the medial upper right   breast is indeterminate (2; 31). Status post left mastectomy. No   aggressive bony lesion.    IMPRESSION:     No pulmonary embolism in the visualized segments. Limited evaluation of   the lingular and left lower lobe subsegmental and segmental branches   secondary to respiratory motion.    Cardiomegaly, pulmonary edema, and trace bilateral pleural effusions.    1.1 cm soft tissue nodule in the medial upper right breast, for which   follow-up dedicated mammographic imaging is advised.    KELSY KELLEY   This document has been electronically signed. May 20 2017  8:32AM    EXAM:  CT BRAIN                          PROCEDURE DATE:  2017        INTERPRETATION:  CT BRAIN    HISTORY:  vomiting/dementia; on eloquis    TECHNIQUE: CT of the head was performed without intravenous contrast.   Multiplanar reformatted images were then generated from the axial   acquired data.  This study was performed using automatic exposure control   (radiation dose reduction software) to obtain a diagnostic image quality   scan with patient dose as low as reasonably achievable.    COMPARISON: None available    FINDINGS:     INTRACRANIAL FINDINGS: Left frontal white matter hypoattenuation   suggestive of vasogenic edema. Associated small foci of hyperdensity may   represent hemorrhage, calcification, or small underlying lesion. No mass   effect or midline shift. No acute territorial infarct.    EXTRACRANIAL FINDINGS: There is expansion of the sella turcica with   surrounding skull base bony destruction of the sella and clivus. The   orbital contents are unremarkable. The visualized paranasal sinuses are   well aerated. The mastoid air cells are clear.    IMPRESSION:     Vasogenic edema in the left frontal lobe with associated small foci of   high attenuation may represent hemorrhage, calcification, or small   underlying lesion. No mass effect or midline shift. Primary consideration   is metastasis.    Expansile lesion in the sella turcica with surrounding bony destruction   of the skull base. Underlying metastasis is likely given the findings in   the abdomen and pelvis.    Critical value:  I discussed the finding of this report with Dr. Dr. Renee at 9:00 AM on 2017.  Critical value policy of the hospital   was followed.  Read back and confirmation of receipt of this   communication was performed.  This verbal communication supplements the   text report of this document.      KELSY KELLEY   This document has been electronically signed. May 20 2017  9:03AM
CHIEF COMPLAINT:Renal lesions    HISTORY OF PRESENT ILLNESS:89 year old lady with multiple medical problems and dementia found to have bilateral renal lesions without evidence for metastasis.    PAST MEDICAL & SURGICAL HISTORY:  H/O total knee replacement: 15 YEARS AGO  H/O mastectomy, left: 30 YEARS AGO      REVIEW OF SYSTEMS:    CONSTITUTIONAL: In NAD, not oriented  EYES/ENT: No visual changes;  No vertigo or throat pain /Deaf  NECK: No pain or stiffness  RESPIRATORY: No cough, wheezing, hemoptysis; No shortness of breath  CARDIOVASCULAR: No chest pain or palpitations  GASTROINTESTINAL: No abdominal or epigastric pain. No nausea, vomiting, or hematemesis; No diarrhea or constipation. No melena or hematochezia.  GENITOURINARY: No dysuria, frequency or hematuria  NEUROLOGICAL: No numbness or weakness  SKIN: No itching, burning, rashes, or lesions   All other review of systems is negative unless indicated above.    MEDICATIONS  (STANDING):  simvastatin 10milliGRAM(s) Oral at bedtime  ATENolol  Tablet 25milliGRAM(s) Oral daily  potassium chloride    Tablet ER 20milliEquivalent(s) Oral daily  dexamethasone  Injectable 4milliGRAM(s) IV Push every 6 hours  pantoprazole  Injectable 40milliGRAM(s) IV Push daily  valsartan 80milliGRAM(s) Oral daily  furosemide   Injectable 40milliGRAM(s) IV Push daily  cefTRIAXone   IVPB  IV Intermittent   cefTRIAXone   IVPB 2Gram(s) IV Intermittent every 24 hours    MEDICATIONS  (PRN):      Allergies    No Known Allergies    Intolerances        SOCIAL HISTORY:    FAMILY HISTORY:      Vital Signs Last 24 Hrs  T(C): 36.4, Max: 36.4 (05-21 @ 21:20)  T(F): 97.5, Max: 97.5 (05-21 @ 21:20)  HR: 68 (68 - 81)  BP: 142/75 (142/75 - 144/87)  BP(mean): --  RR: 16 (15 - 16)  SpO2: 100% (94% - 100%)    PHYSICAL EXAM:    Constitutional: NAD, well-developed/Deaf/Dementia  HEENT: HAYDE, EOMI, Normal Hearing, MMM  Neck: No LAD, No JVD  Back: Normal spine flexure, No CVA tenderness  Respiratory: CTAB   Cardiovascular: S1 and S2, RRR, no M/G/R  Abd: BS+, soft, NT/ND, No CVA  Extremities: No peripheral edema  Vascular:Edema  Neurological:Dementia  Psychiatric: Deaf with Dementia  Musculoskeletal: 5/5 strength b/l upper and lower extremities  Skin: No rashes    LABS:                        12.7   20.7  )-----------( 279      ( 22 May 2017 06:25 )             39.0     05-22    141  |  104  |  42<H>  ----------------------------<  133<H>  4.1   |  30  |  1.14    Ca    10.5<H>      22 May 2017 06:25          Urine Culture:     RADIOLOGY & ADDITIONAL STUDIES:
HPI:  The patient is a 89 yo female a h/o dementia, AFib , HTN, anemia,  was sent to the ER form assisted living for SOB, nausea, vomiting and abd discomfort.  As per the records there is a history of a left breast cancer s/p mastectomy 30 years ago, brain tumor , renal masses/ Imaging here reveal hemorrhagic brain metastases,  bilateral renal mass retroperitoneal node.   Patients condition has stabilized; not in any discomfort. Remains confused         HPI:  The patient is a 89 yo female a h/o AFib on eliquis, dementia, angina, HTN, anemia, incontinence, anxiety,who was sent to the ER form the [facility] assisted living for SOB, nausea, vomiting and abd discomfort.  The patient is not able to give Hx. secondary to dementia, [place]. Transfer records reviewed.   The patient is in bed , alert, disoriented is not c/o pain. (20 May 2017 11:24)      PAST MEDICAL & SURGICAL HISTORY:  H/O total knee replacement: 15 YEARS AGO  H/O mastectomy, left: 30 YEARS AGO      MEDICATIONS  (STANDING):  simvastatin 10milliGRAM(s) Oral at bedtime  ATENolol  Tablet 25milliGRAM(s) Oral daily  dexamethasone  Injectable 4milliGRAM(s) IV Push every 6 hours  valsartan 80milliGRAM(s) Oral daily  cefTRIAXone   IVPB  IV Intermittent   cefTRIAXone   IVPB 2Gram(s) IV Intermittent every 24 hours  furosemide    Tablet 20milliGRAM(s) Oral daily  pantoprazole    Tablet 40milliGRAM(s) Oral before breakfast    MEDICATIONS  (PRN):      Allergies    No Known Allergies    Intolerances        SOCIAL HISTORY:    FAMILY HISTORY:      Vital Signs Last 24 Hrs  T(C): 36.3, Max: 36.9 (05-23 @ 16:11)  T(F): 97.4, Max: 98.4 (05-23 @ 16:11)  HR: 71 (71 - 87)  BP: 163/85 (144/94 - 163/85)  BP(mean): --  RR: 16 (14 - 16)  SpO2: 96% (94% - 96%)      LABS:                        12.6   13.8  )-----------( 291      ( 23 May 2017 06:20 )             37.7     05-23    142  |  103  |  45<H>  ----------------------------<  123<H>  3.9   |  28  |  1.05    Ca    10.3<H>      23 May 2017 06:20            RADIOLOGY & ADDITIONAL STUDIES:    PAST MEDICAL & SURGICAL HISTORY:  H/O total knee replacement: 15 YEARS AGO  H/O mastectomy, left: 30 YEARS AGO      MEDICATIONS  (STANDING):  simvastatin 10milliGRAM(s) Oral at bedtime  ATENolol  Tablet 25milliGRAM(s) Oral daily  dexamethasone  Injectable 4milliGRAM(s) IV Push every 6 hours  valsartan 80milliGRAM(s) Oral daily  cefTRIAXone   IVPB  IV Intermittent   cefTRIAXone   IVPB 2Gram(s) IV Intermittent every 24 hours  furosemide    Tablet 20milliGRAM(s) Oral daily  pantoprazole    Tablet 40milliGRAM(s) Oral before breakfast    MEDICATIONS  (PRN):      Allergies    No Known Allergies    Intolerances        SOCIAL HISTORY:    FAMILY HISTORY:    Patient poor historian  Could not obtain ROS    Appears comfortable  Confused  No nodes  L mastectomy site no rash or masses, no axillary nodes  Abd soft   Ext neg phlebitis        Vital Signs Last 24 Hrs  T(C): 36.3, Max: 36.9 (05-23 @ 16:11)  T(F): 97.4, Max: 98.4 (05-23 @ 16:11)  HR: 71 (71 - 87)  BP: 163/85 (144/94 - 163/85)  BP(mean): --  RR: 16 (14 - 16)  SpO2: 96% (94% - 96%)      LABS:                        12.6   13.8  )-----------( 291      ( 23 May 2017 06:20 )             37.7     05-23    142  |  103  |  45<H>  ----------------------------<  123<H>  3.9   |  28  |  1.05    Ca    10.3<H>      23 May 2017 06:20            RADIOLOGY & ADDITIONAL STUDIES:
Patient is a 89y old  Female who presents with a chief complaint of Nausea, Vomiting, SOB, abdominal discomfort (20 May 2017 11:24)      HPI:  The patient is a 89 yo female a h/o AFib on eliquis, dementia, angina, HTN, anemia, incontinence, anxiety, hx of breast ca s/p mastectomy, admitted  with c/o SOB, nausea, vomiting and abd discomfort, here afebrile, wbc ct 19.4, UA grossly positive, blood cx growing GNRs, urine cx with Ecoli, CT abd/chest/pelvis with 1.1 cm right upper breast mass, b/l solid renal masses concerning for malignancy and changes c/w cystitis, pt was given IV cefepime/vanco.     PMH: as above    PSH: as above    Meds: per reconciliation sheet, noted below    MEDICATIONS  (STANDING):  simvastatin 10milliGRAM(s) Oral at bedtime  ATENolol  Tablet 25milliGRAM(s) Oral daily  potassium chloride    Tablet ER 20milliEquivalent(s) Oral daily  dexamethasone  Injectable 4milliGRAM(s) IV Push every 6 hours  pantoprazole  Injectable 40milliGRAM(s) IV Push daily  valsartan 80milliGRAM(s) Oral daily  furosemide   Injectable 40milliGRAM(s) IV Push daily  cefTRIAXone   IVPB  IV Intermittent   cefTRIAXone   IVPB 2Gram(s) IV Intermittent once      Allergies    No Known Allergies    Intolerances        Social: no smoking, no alcohol, no illegal drugs; no recent travel, no exposure to TB    Family history: NC      ROS: unable to obtain d/t medical condition    Vital Signs Last 24 Hrs  T(C): 36.2, Max: 37.2 (05-20 @ 13:24)  T(F): 97.1, Max: 99 (05-20 @ 13:24)  HR: 81 (76 - 100)  BP: 140/76 (104/58 - 154/82)  BP(mean): --  RR: 17 (3 - 36)  SpO2: 100% (92% - 100%)      PE:  Constitutional: frail looking  HEENT: NC/AT, EOMI, PERRLA  Neck: supple  Back: no tenderness  Respiratory: clear  Cardiovascular: S1S2 regular, no murmurs  Abdomen: soft, not tender, not distended, positive BS  Genitourinary: deferred  Rectal: deferred  Musculoskeletal: no muscle tenderness, no joint swelling or tenderness  Extremities: no pedal edema  Neurological: no focal deficits  Skin: no rashes    Labs:                        12.4   20.0  )-----------( 238      ( 20 May 2017 12:18 )             37.6     05-20    140  |  105  |  18  ----------------------------<  98  4.2   |  24  |  0.91    Ca    9.1      20 May 2017 12:18  Mg     2.0     -    TPro  7.0  /  Alb  3.2<L>  /  TBili  0.9  /  DBili  x   /  AST  15  /  ALT  20  /  AlkPhos  99  -     LIVER FUNCTIONS - ( 20 May 2017 06:01 )  Alb: 3.2 g/dL / Pro: 7.0 gm/dL / ALK PHOS: 99 U/L / ALT: 20 U/L / AST: 15 U/L / GGT: x           Urinalysis Basic - ( 20 May 2017 05:17 )    Color: Yellow / Appearance: very cloudy / S.015 / pH: x  Gluc: x / Ketone: Negative  / Bili: Negative / Urobili: Negative mg/dL   Blood: x / Protein: 30 mg/dL / Nitrite: Positive   Leuk Esterase: Moderate / RBC: 3-5 /HPF / WBC TNTC /HPF   Sq Epi: x / Non Sq Epi: Moderate / Bacteria: Few            Radiology:    Advanced directives addressed: full resuscitation
Patient is a 89y old  Female who presents with a chief complaint of Nausea, Vomiting, SOB, abdominal discomfort.      HPI:  The patient is a 89 yo female a h/o AFib on eliquis, dementia, angina, HTN, anemia, incontinence, anxiety,who was sent to the ER form the [facility] assisted living for SOB, nausea, vomiting and abd discomfort.  The patient is not able to give Hx. secondary to dementia, [place].   Pt had bout of vomiting when I saw her. Denies any CP or SOB.      PAST MEDICAL & SURGICAL HISTORY:      MEDICATIONS  (STANDING):  simvastatin 10milliGRAM(s) Oral at bedtime  ATENolol  Tablet 25milliGRAM(s) Oral daily  potassium chloride    Tablet ER 20milliEquivalent(s) Oral daily  dexamethasone  Injectable 4milliGRAM(s) IV Push every 6 hours  pantoprazole  Injectable 40milliGRAM(s) IV Push daily  valsartan 80milliGRAM(s) Oral daily  furosemide   Injectable 40milliGRAM(s) IV Push daily  cefepime  IVPB 1000milliGRAM(s) IV Intermittent every 12 hours    MEDICATIONS  (PRN):      FAMILY HISTORY: unable to obtain      SOCIAL HISTORY:    REVIEW OF SYSTEMS:  CONSTITUTIONAL:  No night sweats.  No fatigue, malaise, lethargy.  No fever or chills.  HEENT:  Eyes:  No visual changes.  No eye pain.      ENT:  No runny nose.  No epistaxis.  No sinus pain.  No sore throat.  No odynophagia.  No ear pain.  No congestion.  RESPIRATORY:  No cough.  No wheeze.  No hemoptysis.  No shortness of breath.  CARDIOVASCULAR:  No chest pains.  No palpitations. No shortness of breath, No orthopnea or PND.  GASTROINTESTINAL:  No abdominal pain.  No nausea or vomiting.  No diarrhea or constipation.  No hematemesis.  No hematochezia.  No melena.  GENITOURINARY:  No urgency.  No frequency.  No dysuria.  No hematuria.  No obstructive symptoms.  No discharge.  No pain.  No significant abnormal bleeding.  MUSCULOSKELETAL:  No musculoskeletal pain.  No joint swelling.  No arthritis.  NEUROLOGICAL:  No tingling or numbness or weakness.  PSYCHIATRIC:  No confusion  SKIN:  No rashes.  No lesions.  No wounds.  ENDOCRINE:  No unexplained weight loss.  No polydipsia.  No polyuria.  No polyphagia.  HEMATOLOGIC:  No anemia.  No purpura.  No petechiae.  No prolonged or excessive bleeding.   ALLERGIC AND IMMUNOLOGIC:  No pruritus.  No swelling.         Vital Signs Last 24 Hrs  T(C): 37.2, Max: 38.4 (05-20 @ 06:43)  T(F): 99, Max: 101.1 (05-20 @ 06:43)  HR: 100 (83 - 103)  BP: 104/58 (104/58 - 151/93)  BP(mean): --  RR: 3 (3 - 36)  SpO2: 97% (89% - 98%)    PHYSICAL EXAM-    Constitutional: sleepy    Head: Head is normocephalic and atraumatic.      Neck: The patient's neck is supple without enlargement, has no palpable thyromegaly nor thyroid nodules and has no jugular venous distention. No audible carotid bruits. There are strong carotid pulses bilaterally. No JVD.     Cardiovascular: Regular rate and rhythm without S3, S4. No murmurs or rubs are appreciated.      Respiratory: Breath sounds are normal. No rales. No wheezing.    Abdomen: Soft, nontender, nondistended with positive bowel sounds.      Extremity: No tenderness. No  pitting edema No skin discoloration No clubbing No cyanosis.     Neurologic: The patient is alert and oriented.      Skin: No rash, no obvious lesions noted.      Psychiatric: sleepy      INTERPRETATION OF TELEMETRY:    ECG:afib, normal Axis, poor R wave progression, T wave flattening in V5-6 and inferior leads.    I&O's Detail    I & Os for current day (as of 20 May 2017 15:01)  =============================================  IN:    Sodium Chloride 0.9% IV Bolus: 1000 ml    Total IN: 1000 ml  ---------------------------------------------  OUT:    Total OUT: 0 ml  ---------------------------------------------  Total NET: 1000 ml      LABS:                        12.4   20.0  )-----------( 238      ( 20 May 2017 12:18 )             37.6     05-20    140  |  105  |  18  ----------------------------<  98  4.2   |  24  |  0.91    Ca    9.1      20 May 2017 12:18  Mg     2.0     -20    TPro  7.0  /  Alb  3.2<L>  /  TBili  0.9  /  DBili  x   /  AST  15  /  ALT  20  /  AlkPhos  99  05-20    CARDIAC MARKERS ( 20 May 2017 12:18 )  0.071 ng/mL / x     / x     / x     / x      CARDIAC MARKERS ( 20 May 2017 09:38 )  0.076 ng/mL / x     / x     / x     / x      CARDIAC MARKERS ( 20 May 2017 06:01 )  0.060 ng/mL / x     / 28 U/L / x     / x          PT/INR - ( 20 May 2017 06:01 )   PT: 16.1 sec;   INR: 1.48 ratio         PTT - ( 20 May 2017 06:01 )  PTT:37.9 sec  Urinalysis Basic - ( 20 May 2017 05:17 )    Color: Yellow / Appearance: very cloudy / S.015 / pH: x  Gluc: x / Ketone: Negative  / Bili: Negative / Urobili: Negative mg/dL   Blood: x / Protein: 30 mg/dL / Nitrite: Positive   Leuk Esterase: Moderate / RBC: 3-5 /HPF / WBC TNTC /HPF   Sq Epi: x / Non Sq Epi: Moderate / Bacteria: Few      I&O's Summary    I & Os for current day (as of 20 May 2017 15:01)  =============================================  IN: 1000 ml / OUT: 0 ml / NET: 1000 ml    BNPSerum Pro-Brain Natriuretic Peptide: 6138 pg/mL ( @ 06:01)    RADIOLOGY & ADDITIONAL STUDIES:

## 2017-05-24 NOTE — PROGRESS NOTE ADULT - SUBJECTIVE AND OBJECTIVE BOX
The patient is a 89 yo female a h/o AFib on eliquis, dementia, angina, HTN, anemia, incontinence, anxiety, hx of breast ca s/p mastectomy, admitted  with c/o SOB, nausea, vomiting and abd discomfort, here afebrile, wbc ct 19.4, UA grossly positive, blood cx growing GNRs, urine cx with Ecoli, CT abd/chest/pelvis with 1.1 cm right upper breast mass, b/l solid renal masses concerning for malignancy and changes c/w cystitis, pt was given IV cefepime/vanco.     no fevers  more awake/alert    MEDICATIONS  (STANDING):  simvastatin 10milliGRAM(s) Oral at bedtime  ATENolol  Tablet 25milliGRAM(s) Oral daily  dexamethasone  Injectable 4milliGRAM(s) IV Push every 6 hours  valsartan 80milliGRAM(s) Oral daily  furosemide    Tablet 20milliGRAM(s) Oral daily  pantoprazole    Tablet 40milliGRAM(s) Oral before breakfast  cefuroxime   Tablet 500milliGRAM(s) Oral every 12 hours  amLODIPine   Tablet 5milliGRAM(s) Oral daily      Vital Signs Last 24 Hrs  T(C): 37.1, Max: 37.1 (05-24 @ 10:18)  T(F): 98.7, Max: 98.7 (05-24 @ 10:18)  HR: 78 (71 - 87)  BP: 156/111 (144/94 - 163/85)  BP(mean): --  RR: 16 (14 - 16)  SpO2: 92% (92% - 96%)            PE:  Constitutional: frail looking  HEENT: NC/AT, EOMI, PERRLA  Neck: supple  Back: no tenderness  Respiratory: clear  Cardiovascular: S1S2 regular, no murmurs  Abdomen: soft, not tender, not distended, positive BS  Genitourinary: deferred  Rectal: deferred  Musculoskeletal: no muscle tenderness, no joint swelling or tenderness  Extremities: no pedal edema  Neurological: no focal deficits  Skin: no rashes    Labs:                         12.6   13.8  )-----------( 291      ( 23 May 2017 06:20 )             37.7     -    142  |  103  |  45<H>  ----------------------------<  123<H>  3.9   |  28  |  1.05    Ca    10.3<H>      23 May 2017 06:20             Cultures:     Culture - Urine (17 @ 05:17)    -  Meropenem: S <=1    -  Tobramycin: S <=4    -  Amikacin: S <=16    -  Cefepime: S <=4    -  Ertapenem: S <=1    -  Imipenem: S <=1    -  Cefoxitin: I 16    -  Ceftazidime: S <=1    -  Gentamicin: S <=4    -  Nitrofurantoin: S <=32    -  Piperacillin/Tazobactam: S <=16    -  Ampicillin: R >16    -  Ampicillin/Sulbactam: S <=8/4    -  Aztreonam: S <=4    -  Cefazolin: R >16    -  Ceftriaxone: S <=1    -  Ciprofloxacin: S <=1    -  Levofloxacin: S <=2    -  Trimethoprim/Sulfamethoxazole: S <=2/38    Specimen Source: .Urine Catheterized    Culture Results:   50,000 - 99,000 CFU/mL Escherichia coli    Organism Identification: Escherichia coli    Organism: Escherichia coli    Method Type: ROGER      141  |  104  |  42<H>  ----------------------------<  133<H>  4.1   |  30  |  1.14    Ca    10.5<H>      22 May 2017 06:25                       Urinalysis Basic - ( 20 May 2017 05:17 )    Color: Yellow / Appearance: very cloudy / S.015 / pH: x  Gluc: x / Ketone: Negative  / Bili: Negative / Urobili: Negative mg/dL   Blood: x / Protein: 30 mg/dL / Nitrite: Positive   Leuk Esterase: Moderate / RBC: 3-5 /HPF / WBC TNTC /HPF   Sq Epi: x / Non Sq Epi: Moderate / Bacteria: Few      Culture - Blood (17 @ 06:01)    Gram Stain:   Growth in aerobic bottle: Gram Negative Rods    Specimen Source: .Blood None    Culture Results:   Growth in aerobic bottle: Escherichia coli  Susceptibility to follow.    Culture - Blood (17 @ 06:01)    Gram Stain:   Growth in aerobic and anaerobic bottles: Gram Negative Rods    Specimen Source: .Blood None    Culture Results:   Growth in aerobic and anaerobic bottles: Escherichia coli  See previous culture [id number]            Radiology: reviewed    Advanced directives addressed:DNR/DNI

## 2017-05-24 NOTE — CONSULT NOTE ADULT - CONSULT REASON
Multiple Brain Lesions
89 year old female, h/o breast cancer, bilateral renal masses, CNS metastases
GOC
Renal mass
SOB
gnr sepsis/ecoli uti

## 2017-05-26 DIAGNOSIS — D64.9 ANEMIA, UNSPECIFIED: ICD-10-CM

## 2017-05-26 DIAGNOSIS — Z85.3 PERSONAL HISTORY OF MALIGNANT NEOPLASM OF BREAST: ICD-10-CM

## 2017-05-26 DIAGNOSIS — I24.8 OTHER FORMS OF ACUTE ISCHEMIC HEART DISEASE: ICD-10-CM

## 2017-05-26 DIAGNOSIS — I48.91 UNSPECIFIED ATRIAL FIBRILLATION: ICD-10-CM

## 2017-05-26 DIAGNOSIS — I50.31 ACUTE DIASTOLIC (CONGESTIVE) HEART FAILURE: ICD-10-CM

## 2017-05-26 DIAGNOSIS — A41.50 GRAM-NEGATIVE SEPSIS, UNSPECIFIED: ICD-10-CM

## 2017-05-26 DIAGNOSIS — C79.31 SECONDARY MALIGNANT NEOPLASM OF BRAIN: ICD-10-CM

## 2017-05-26 DIAGNOSIS — F03.90 UNSPECIFIED DEMENTIA, UNSPECIFIED SEVERITY, WITHOUT BEHAVIORAL DISTURBANCE, PSYCHOTIC DISTURBANCE, MOOD DISTURBANCE, AND ANXIETY: ICD-10-CM

## 2017-05-26 DIAGNOSIS — I11.0 HYPERTENSIVE HEART DISEASE WITH HEART FAILURE: ICD-10-CM

## 2017-05-26 DIAGNOSIS — N39.0 URINARY TRACT INFECTION, SITE NOT SPECIFIED: ICD-10-CM

## 2017-05-26 DIAGNOSIS — I61.9 NONTRAUMATIC INTRACEREBRAL HEMORRHAGE, UNSPECIFIED: ICD-10-CM

## 2017-05-28 LAB
CULTURE RESULTS: SIGNIFICANT CHANGE UP
CULTURE RESULTS: SIGNIFICANT CHANGE UP
SPECIMEN SOURCE: SIGNIFICANT CHANGE UP
SPECIMEN SOURCE: SIGNIFICANT CHANGE UP

## 2017-06-01 ENCOUNTER — INPATIENT (INPATIENT)
Facility: HOSPITAL | Age: 82
LOS: 0 days | Discharge: HOSPICE | End: 2017-06-01
Attending: INTERNAL MEDICINE | Admitting: INTERNAL MEDICINE
Payer: MEDICARE

## 2017-06-01 VITALS
WEIGHT: 214.95 LBS | RESPIRATION RATE: 24 BRPM | SYSTOLIC BLOOD PRESSURE: 146 MMHG | DIASTOLIC BLOOD PRESSURE: 54 MMHG | OXYGEN SATURATION: 100 % | HEART RATE: 86 BPM

## 2017-06-01 VITALS
HEART RATE: 79 BPM | OXYGEN SATURATION: 97 % | SYSTOLIC BLOOD PRESSURE: 148 MMHG | TEMPERATURE: 100 F | RESPIRATION RATE: 16 BRPM | DIASTOLIC BLOOD PRESSURE: 91 MMHG

## 2017-06-01 DIAGNOSIS — Z96.659 PRESENCE OF UNSPECIFIED ARTIFICIAL KNEE JOINT: Chronic | ICD-10-CM

## 2017-06-01 DIAGNOSIS — Z90.12 ACQUIRED ABSENCE OF LEFT BREAST AND NIPPLE: Chronic | ICD-10-CM

## 2017-06-01 LAB
ALBUMIN SERPL ELPH-MCNC: 2.8 G/DL — LOW (ref 3.3–5)
ALP SERPL-CCNC: 75 U/L — SIGNIFICANT CHANGE UP (ref 40–120)
ALT FLD-CCNC: 21 U/L — SIGNIFICANT CHANGE UP (ref 12–78)
ANION GAP SERPL CALC-SCNC: 4 MMOL/L — LOW (ref 5–17)
APPEARANCE UR: CLEAR — SIGNIFICANT CHANGE UP
APTT BLD: 30.8 SEC — SIGNIFICANT CHANGE UP (ref 27.5–37.4)
AST SERPL-CCNC: 17 U/L — SIGNIFICANT CHANGE UP (ref 15–37)
BACTERIA # UR AUTO: (no result)
BASO STIPL BLD QL SMEAR: SLIGHT — SIGNIFICANT CHANGE UP
BILIRUB SERPL-MCNC: 0.7 MG/DL — SIGNIFICANT CHANGE UP (ref 0.2–1.2)
BILIRUB UR-MCNC: NEGATIVE — SIGNIFICANT CHANGE UP
BUN SERPL-MCNC: 13 MG/DL — SIGNIFICANT CHANGE UP (ref 7–23)
CALCIUM SERPL-MCNC: 9.5 MG/DL — SIGNIFICANT CHANGE UP (ref 8.5–10.1)
CHLORIDE SERPL-SCNC: 101 MMOL/L — SIGNIFICANT CHANGE UP (ref 96–108)
CK SERPL-CCNC: 29 U/L — SIGNIFICANT CHANGE UP (ref 26–192)
CO2 SERPL-SCNC: 30 MMOL/L — SIGNIFICANT CHANGE UP (ref 22–31)
COLOR SPEC: YELLOW — SIGNIFICANT CHANGE UP
CREAT SERPL-MCNC: 0.81 MG/DL — SIGNIFICANT CHANGE UP (ref 0.5–1.3)
DIFF PNL FLD: (no result)
EPI CELLS # UR: (no result)
GLUCOSE SERPL-MCNC: 113 MG/DL — HIGH (ref 70–99)
GLUCOSE UR QL: NEGATIVE MG/DL — SIGNIFICANT CHANGE UP
HCT VFR BLD CALC: 36.1 % — SIGNIFICANT CHANGE UP (ref 34.5–45)
HGB BLD-MCNC: 12.2 G/DL — SIGNIFICANT CHANGE UP (ref 11.5–15.5)
INR BLD: 1.07 RATIO — SIGNIFICANT CHANGE UP (ref 0.88–1.16)
KETONES UR-MCNC: NEGATIVE — SIGNIFICANT CHANGE UP
LEUKOCYTE ESTERASE UR-ACNC: (no result)
LIDOCAIN IGE QN: 60 U/L — LOW (ref 73–393)
LYMPHOCYTES # BLD AUTO: 6 % — LOW (ref 13–44)
MANUAL DIF COMMENT BLD-IMP: SIGNIFICANT CHANGE UP
MANUAL SMEAR VERIFICATION: SIGNIFICANT CHANGE UP
MCHC RBC-ENTMCNC: 31.3 PG — SIGNIFICANT CHANGE UP (ref 27–34)
MCHC RBC-ENTMCNC: 33.8 GM/DL — SIGNIFICANT CHANGE UP (ref 32–36)
MCV RBC AUTO: 92.6 FL — SIGNIFICANT CHANGE UP (ref 80–100)
MONOCYTES NFR BLD AUTO: 9 % — SIGNIFICANT CHANGE UP (ref 2–14)
NEUTROPHILS NFR BLD AUTO: 85 % — HIGH (ref 43–77)
NITRITE UR-MCNC: NEGATIVE — SIGNIFICANT CHANGE UP
NT-PROBNP SERPL-SCNC: 6783 PG/ML — HIGH (ref 0–450)
OVALOCYTES BLD QL SMEAR: SLIGHT — SIGNIFICANT CHANGE UP
PH UR: 6.5 — SIGNIFICANT CHANGE UP (ref 5–8)
PLAT MORPH BLD: NORMAL — SIGNIFICANT CHANGE UP
PLATELET # BLD AUTO: 276 K/UL — SIGNIFICANT CHANGE UP (ref 150–400)
POIKILOCYTOSIS BLD QL AUTO: SLIGHT — SIGNIFICANT CHANGE UP
POTASSIUM SERPL-MCNC: 4.2 MMOL/L — SIGNIFICANT CHANGE UP (ref 3.5–5.3)
POTASSIUM SERPL-SCNC: 4.2 MMOL/L — SIGNIFICANT CHANGE UP (ref 3.5–5.3)
PROT SERPL-MCNC: 6.4 GM/DL — SIGNIFICANT CHANGE UP (ref 6–8.3)
PROT UR-MCNC: NEGATIVE MG/DL — SIGNIFICANT CHANGE UP
PROTHROM AB SERPL-ACNC: 11.6 SEC — SIGNIFICANT CHANGE UP (ref 9.8–12.7)
RBC # BLD: 3.9 M/UL — SIGNIFICANT CHANGE UP (ref 3.8–5.2)
RBC # FLD: 14.2 % — SIGNIFICANT CHANGE UP (ref 10.3–14.5)
RBC BLD AUTO: (no result)
RBC CASTS # UR COMP ASSIST: (no result) /HPF (ref 0–4)
SCHISTOCYTES BLD QL AUTO: SLIGHT — SIGNIFICANT CHANGE UP
SODIUM SERPL-SCNC: 135 MMOL/L — SIGNIFICANT CHANGE UP (ref 135–145)
SP GR SPEC: 1.01 — SIGNIFICANT CHANGE UP (ref 1.01–1.02)
TROPONIN I SERPL-MCNC: 0.06 NG/ML — HIGH (ref 0.01–0.04)
UROBILINOGEN FLD QL: NEGATIVE MG/DL — SIGNIFICANT CHANGE UP
WBC # BLD: 17 K/UL — HIGH (ref 3.8–10.5)
WBC # FLD AUTO: 17 K/UL — HIGH (ref 3.8–10.5)
WBC UR QL: SIGNIFICANT CHANGE UP

## 2017-06-01 PROCEDURE — 99285 EMERGENCY DEPT VISIT HI MDM: CPT

## 2017-06-01 PROCEDURE — 93010 ELECTROCARDIOGRAM REPORT: CPT

## 2017-06-01 PROCEDURE — 71010: CPT | Mod: 26

## 2017-06-01 RX ORDER — AMLODIPINE BESYLATE 2.5 MG/1
5 TABLET ORAL DAILY
Qty: 0 | Refills: 0 | Status: DISCONTINUED | OUTPATIENT
Start: 2017-06-01 | End: 2017-06-01

## 2017-06-01 RX ORDER — CEFTRIAXONE 500 MG/1
1 INJECTION, POWDER, FOR SOLUTION INTRAMUSCULAR; INTRAVENOUS ONCE
Qty: 0 | Refills: 0 | Status: COMPLETED | OUTPATIENT
Start: 2017-06-01 | End: 2017-06-01

## 2017-06-01 RX ORDER — DOCUSATE SODIUM 100 MG
100 CAPSULE ORAL THREE TIMES A DAY
Qty: 0 | Refills: 0 | Status: DISCONTINUED | OUTPATIENT
Start: 2017-06-01 | End: 2017-06-01

## 2017-06-01 RX ORDER — ONDANSETRON 8 MG/1
4 TABLET, FILM COATED ORAL EVERY 6 HOURS
Qty: 0 | Refills: 0 | Status: DISCONTINUED | OUTPATIENT
Start: 2017-06-01 | End: 2017-06-01

## 2017-06-01 RX ORDER — VALSARTAN 80 MG/1
160 TABLET ORAL DAILY
Qty: 0 | Refills: 0 | Status: DISCONTINUED | OUTPATIENT
Start: 2017-06-01 | End: 2017-06-01

## 2017-06-01 RX ORDER — ASCORBIC ACID 60 MG
1 TABLET,CHEWABLE ORAL
Qty: 0 | Refills: 0 | COMMUNITY

## 2017-06-01 RX ORDER — DULOXETINE HYDROCHLORIDE 30 MG/1
30 CAPSULE, DELAYED RELEASE ORAL DAILY
Qty: 0 | Refills: 0 | Status: DISCONTINUED | OUTPATIENT
Start: 2017-06-01 | End: 2017-06-01

## 2017-06-01 RX ORDER — FOLIC ACID 0.8 MG
1 TABLET ORAL DAILY
Qty: 0 | Refills: 0 | Status: DISCONTINUED | OUTPATIENT
Start: 2017-06-01 | End: 2017-06-01

## 2017-06-01 RX ORDER — FUROSEMIDE 40 MG
1 TABLET ORAL
Qty: 0 | Refills: 0 | COMMUNITY

## 2017-06-01 RX ORDER — AMLODIPINE BESYLATE 2.5 MG/1
1 TABLET ORAL
Qty: 0 | Refills: 0 | COMMUNITY
Start: 2017-06-01

## 2017-06-01 RX ORDER — DULOXETINE HYDROCHLORIDE 30 MG/1
1 CAPSULE, DELAYED RELEASE ORAL
Qty: 0 | Refills: 0 | COMMUNITY

## 2017-06-01 RX ORDER — SENNA PLUS 8.6 MG/1
2 TABLET ORAL AT BEDTIME
Qty: 0 | Refills: 0 | Status: DISCONTINUED | OUTPATIENT
Start: 2017-06-01 | End: 2017-06-01

## 2017-06-01 RX ORDER — ATENOLOL 25 MG/1
1 TABLET ORAL
Qty: 0 | Refills: 0 | COMMUNITY

## 2017-06-01 RX ORDER — VALSARTAN 80 MG/1
1 TABLET ORAL
Qty: 0 | Refills: 0 | COMMUNITY
Start: 2017-06-01

## 2017-06-01 RX ORDER — ATENOLOL 25 MG/1
25 TABLET ORAL DAILY
Qty: 0 | Refills: 0 | Status: DISCONTINUED | OUTPATIENT
Start: 2017-06-01 | End: 2017-06-01

## 2017-06-01 RX ORDER — FUROSEMIDE 40 MG
40 TABLET ORAL
Qty: 0 | Refills: 0 | Status: DISCONTINUED | OUTPATIENT
Start: 2017-06-01 | End: 2017-06-01

## 2017-06-01 RX ORDER — ASPIRIN/CALCIUM CARB/MAGNESIUM 324 MG
325 TABLET ORAL ONCE
Qty: 0 | Refills: 0 | Status: COMPLETED | OUTPATIENT
Start: 2017-06-01 | End: 2017-06-01

## 2017-06-01 RX ORDER — DULOXETINE HYDROCHLORIDE 30 MG/1
1 CAPSULE, DELAYED RELEASE ORAL
Qty: 0 | Refills: 0 | COMMUNITY
Start: 2017-06-01

## 2017-06-01 RX ORDER — AMLODIPINE AND VALSARTAN 5; 320 MG/1; MG/1
1 TABLET, FILM COATED ORAL
Qty: 0 | Refills: 0 | COMMUNITY

## 2017-06-01 RX ORDER — FUROSEMIDE 40 MG
40 TABLET ORAL ONCE
Qty: 0 | Refills: 0 | Status: COMPLETED | OUTPATIENT
Start: 2017-06-01 | End: 2017-06-01

## 2017-06-01 RX ORDER — SIMVASTATIN 20 MG/1
10 TABLET, FILM COATED ORAL AT BEDTIME
Qty: 0 | Refills: 0 | Status: DISCONTINUED | OUTPATIENT
Start: 2017-06-01 | End: 2017-06-01

## 2017-06-01 RX ORDER — SODIUM CHLORIDE 9 MG/ML
3 INJECTION INTRAMUSCULAR; INTRAVENOUS; SUBCUTANEOUS ONCE
Qty: 0 | Refills: 0 | Status: COMPLETED | OUTPATIENT
Start: 2017-06-01 | End: 2017-06-01

## 2017-06-01 RX ORDER — ATENOLOL 25 MG/1
1 TABLET ORAL
Qty: 0 | Refills: 0 | COMMUNITY
Start: 2017-06-01

## 2017-06-01 RX ADMIN — AMLODIPINE BESYLATE 5 MILLIGRAM(S): 2.5 TABLET ORAL at 10:54

## 2017-06-01 RX ADMIN — Medication 40 MILLIGRAM(S): at 17:10

## 2017-06-01 RX ADMIN — Medication 325 MILLIGRAM(S): at 04:59

## 2017-06-01 RX ADMIN — SODIUM CHLORIDE 3 MILLILITER(S): 9 INJECTION INTRAMUSCULAR; INTRAVENOUS; SUBCUTANEOUS at 04:59

## 2017-06-01 RX ADMIN — VALSARTAN 160 MILLIGRAM(S): 80 TABLET ORAL at 10:55

## 2017-06-01 RX ADMIN — ATENOLOL 25 MILLIGRAM(S): 25 TABLET ORAL at 10:54

## 2017-06-01 RX ADMIN — Medication 100 MILLIGRAM(S): at 15:49

## 2017-06-01 RX ADMIN — DULOXETINE HYDROCHLORIDE 30 MILLIGRAM(S): 30 CAPSULE, DELAYED RELEASE ORAL at 10:55

## 2017-06-01 RX ADMIN — CEFTRIAXONE 100 GRAM(S): 500 INJECTION, POWDER, FOR SOLUTION INTRAMUSCULAR; INTRAVENOUS at 06:55

## 2017-06-01 RX ADMIN — Medication 1 MILLIGRAM(S): at 10:55

## 2017-06-01 RX ADMIN — Medication 40 MILLIGRAM(S): at 06:55

## 2017-06-01 NOTE — ED ADULT NURSE NOTE - CHPI ED SYMPTOMS NEG
no fever/no chest pain/no edema/no chills/no diaphoresis/no headache/no cough/no body aches/no wheezing/no hemoptysis

## 2017-06-01 NOTE — ED ADULT NURSE REASSESSMENT NOTE - COMFORT CARE
warm blanket provided/repositioned/assisted to bedpan
5pm rounding done, patient asleep no other assistance needed
clean and dry
darkened lights/pt incontinent, hygiene and skin care done/repositioned/po fluids offered
pt sleeping

## 2017-06-01 NOTE — ED ADULT NURSE REASSESSMENT NOTE - NS ED NURSE REASSESS COMMENT FT1
patient arrived via senior care ambulance from the [facility] for shortness of breath x1 hour prior to arrival. per ems, patient was at 88% oxygen saturation on room air when they arrived. arrived to ED on 100% nonrebreather, oxygen saturation 100% on arrival. patient alert and oriented. placed in bed 1 on monitor.
Disposition being determined, pt may be returning to [place] with hospice care.
EMS present for transport to hospice, vss, paperwork done
Pt sleeping, easily arousable. Pt incontinent of large amount of urine. Pericare provided. Skin intact. Pt resting comfortably at this time. No acute distress noted. will continue to monitor.
Pt sleeping. VS as charted. No acute distress noted at this time. Will continue to monitor.
urine obtained via straight catheterization, sent awaiting result.
Pt rec'd from [facility]. Pt is awake and confused. Only verbalizes a few words. No s/s of any acute distress. Morning meds given. Cardiac monitoring in place. Awaiting for bed assignment. Safety and comfort maintained.

## 2017-06-01 NOTE — ED ADULT NURSE NOTE - OBJECTIVE STATEMENT
present from [facility], per ems patient had shortness of breath x 1 hour prior to arrival. arrived on 100% nonrebreather, placed on nasal cannula 3 liters o2, maintaining oxygen saturation greater than 97%. baseline

## 2017-06-01 NOTE — ED PROVIDER NOTE - OBJECTIVE STATEMENT
89 y/o female presents to the ED c/o SOB. Pt is from [facility]. Pt denies any trouble breathing, pain. 89 y/o female presents to the ED c/o SOB. Pt is from [facility]. Pt denies any trouble breathing, pain. Dr. Arthur Garland, PMD.

## 2017-06-01 NOTE — ED PROVIDER NOTE - MEDICAL DECISION MAKING DETAILS
pt with ho chf, recently treated for uti, now with sob with elevated troponin, pt aslo has ho possbile mets found on previous admission will admit treat for chf but highly recommend palliative care consult, case d/w Dr. Sepulveda

## 2017-06-01 NOTE — ED PROVIDER NOTE - DETAILS:
I, Natty De Los Santos, performed the initial face to face bedside interview with this patient regarding history of present illness, review of symptoms and relevant past medical, social and family history.  I completed an independent physical examination.  I was the initial provider who evaluated this patient. The history, relevant review of systems, past medical and surgical history, medical decision making, and physical examination was documented by the scribe in my presence and I attest to the accuracy of the documentation.

## 2017-06-01 NOTE — H&P ADULT - ASSESSMENT
89 yo female a/w SOB    # SOB/CHF exacerbation  - afebrile, HD stable comfortable on 2LNC, sa 97%  - CXR with diffuse interstitial markings in an under penetrated film  - has elevated WBC 17, last admission 13.8, no endorsed cough, CXR without focal consolidation  - Lasix IV  - BBL, ARB  - BP control    # Leukocytosis  - afebrile, HD stable, comfortable on 2LNC, sat 97%  - UA neg, CXR without focal consolidation, benign abd exam, no endorsed hx of diarrhea  - panculture  - monitor for now    # HTN  - Norvasc, Losartan    # Renal Mass  - observation only at her age as per Dr. Bolton during last admission in May    # Afib  - Hold A/C in setting of old brain bleed as per last hospitalization  - BBL    # Dementia  - patient not on any meds    # DVT ppx, SCDs

## 2017-06-01 NOTE — H&P ADULT - NSHPPHYSICALEXAM_GEN_ALL_CORE
T(C): 36.8, Max: 37.9 (06-01 @ 06:45)  HR: 72 (72 - 88)  BP: 147/67 (127/75 - 147/67)  RR: 25 (18 - 25)  SpO2: 99% (98% - 100%)  Wt(kg): --    Gen: Awake, smiles, NAD  HEENT: NCAT, EOMI  Neck: Supple  CV: nml S1S2, RRR  Lungs: b/l crackles  Abd: Soft, NT, ND, BS+  Ext: No edema  Neuro: Non focal

## 2017-06-01 NOTE — DISCHARGE NOTE ADULT - HOSPITAL COURSE
Patient was briefly admitted to the wards for CHF exacerbation, treated with IV lasix. Discharged to hospice house.

## 2017-06-01 NOTE — H&P ADULT - HISTORY OF PRESENT ILLNESS
Pt is 89 yo female a h/o AFib not on A/C, severe dementia, CAD HTN, anemia, incontinence, anxiety, h/o of prior brain tumor s/p cyber knife, h/o breast CA s/p mastectomy with recent admission on 5/20  form the [facility] assisted living for SOB/CHF. found to have brain metastasis, small hemorrhage, renal mass presents with SOB. Pt has severe dementia and is non verbal which is her baseline as per NH records, and therefore cannot provide history. No endorsed history as per the chart of fever, chills, cough, chest pain, palpitations. No other constitutional symptoms. Pt with recent admission here at  for renal mass, brain mets, and CHF.

## 2017-06-01 NOTE — DISCHARGE NOTE ADULT - MEDICATION SUMMARY - MEDICATIONS TO CHANGE
I will SWITCH the dose or number of times a day I take the medications listed below when I get home from the hospital:    ascorbic acid 500 mg oral tablet  -- 1 tab(s) by mouth once a day    atenolol 25 mg oral tablet  -- 1 tab(s) by mouth once a day    Cymbalta 30 mg oral delayed release capsule  -- 1 cap(s) by mouth once a day    Exforge 5 mg-160 mg oral tablet  -- 1 tab(s) by mouth once a day    folic acid 1 mg oral tablet  -- 1 tab(s) by mouth once a day    Lasix 40 mg oral tablet  -- 1 tab(s) by mouth once a day    simvastatin 10 mg oral tablet  -- 1 tab(s) by mouth once a day (at bedtime)    cefuroxime 500 mg oral tablet  -- 1 tab(s) by mouth every 12 hours

## 2017-06-01 NOTE — ED PROVIDER NOTE - NS ED MD SCRIBE ATTENDING SCRIBE SECTIONS
RESULTS/PAST MEDICAL/SURGICAL/SOCIAL HISTORY/DISPOSITION/VITAL SIGNS( Pullset)/REVIEW OF SYSTEMS/PHYSICAL EXAM/PROGRESS NOTE/HISTORY OF PRESENT ILLNESS

## 2017-06-01 NOTE — ED PROVIDER NOTE - RESPIRATORY, MLM
Decreased breath sounds B/L, possibly due to Pt not following commands to take deep breaths due to dementia.

## 2017-06-01 NOTE — DISCHARGE NOTE ADULT - MEDICATION SUMMARY - MEDICATIONS TO TAKE
I will START or STAY ON the medications listed below when I get home from the hospital:    Diovan 160 mg oral tablet  -- 1 tab(s) by mouth once a day  -- Indication: For CHF (congestive heart failure)    DULoxetine 30 mg oral delayed release capsule  -- 1 cap(s) by mouth once a day  -- Indication: For CHF (congestive heart failure)    simvastatin 10 mg oral tablet  -- 1 tab(s) by mouth once a day (at bedtime)  -- Indication: For CONGESTIVE HEART FAILURE    atenolol 25 mg oral tablet  -- 1 tab(s) by mouth once a day  -- Indication: For CHF (congestive heart failure)    Norvasc 5 mg oral tablet  -- 1 tab(s) by mouth once a day  -- Indication: For CHF (congestive heart failure)    folic acid 1 mg oral tablet  -- 1 tab(s) by mouth once a day  -- Indication: For CHF (congestive heart failure)

## 2017-06-01 NOTE — DISCHARGE NOTE ADULT - PATIENT PORTAL LINK FT
“You can access the [facility] Patient Portal, offered by [facility], by registering with the following website: [email or web address]”

## 2017-06-06 DIAGNOSIS — I50.9 HEART FAILURE, UNSPECIFIED: ICD-10-CM

## 2017-06-06 DIAGNOSIS — F41.9 ANXIETY DISORDER, UNSPECIFIED: ICD-10-CM

## 2017-06-06 DIAGNOSIS — I25.10 ATHEROSCLEROTIC HEART DISEASE OF NATIVE CORONARY ARTERY WITHOUT ANGINA PECTORIS: ICD-10-CM

## 2017-06-06 DIAGNOSIS — R06.02 SHORTNESS OF BREATH: ICD-10-CM

## 2017-06-06 DIAGNOSIS — C79.31 SECONDARY MALIGNANT NEOPLASM OF BRAIN: ICD-10-CM

## 2017-06-06 DIAGNOSIS — F03.90 UNSPECIFIED DEMENTIA, UNSPECIFIED SEVERITY, WITHOUT BEHAVIORAL DISTURBANCE, PSYCHOTIC DISTURBANCE, MOOD DISTURBANCE, AND ANXIETY: ICD-10-CM

## 2017-06-06 DIAGNOSIS — Z85.3 PERSONAL HISTORY OF MALIGNANT NEOPLASM OF BREAST: ICD-10-CM

## 2017-06-06 DIAGNOSIS — I11.0 HYPERTENSIVE HEART DISEASE WITH HEART FAILURE: ICD-10-CM

## 2017-06-06 DIAGNOSIS — I48.91 UNSPECIFIED ATRIAL FIBRILLATION: ICD-10-CM

## 2017-06-06 DIAGNOSIS — I50.33 ACUTE ON CHRONIC DIASTOLIC (CONGESTIVE) HEART FAILURE: ICD-10-CM

## 2017-06-06 DIAGNOSIS — Z90.12 ACQUIRED ABSENCE OF LEFT BREAST AND NIPPLE: ICD-10-CM

## 2017-06-06 DIAGNOSIS — D72.829 ELEVATED WHITE BLOOD CELL COUNT, UNSPECIFIED: ICD-10-CM

## 2017-06-06 DIAGNOSIS — N28.9 DISORDER OF KIDNEY AND URETER, UNSPECIFIED: ICD-10-CM

## 2017-06-06 DIAGNOSIS — Z96.659 PRESENCE OF UNSPECIFIED ARTIFICIAL KNEE JOINT: ICD-10-CM

## 2017-06-06 LAB
CULTURE RESULTS: SIGNIFICANT CHANGE UP
SPECIMEN SOURCE: SIGNIFICANT CHANGE UP

## 2017-11-02 NOTE — PATIENT PROFILE ADULT. - MEDICATION HERBAL REMEDIES, PROFILE
SHEELA JOSEPH    [id number]    77y      Male    INTERVAL HPI/OVERNIGHT EVENTS: No events on. Feels well today. Wife at bedside.     Hospital course:  76 yo M with h/o CAD, CABG, heart block s/p PPM, Phil' s esophagus, CKD Stage 3, oral ca s/p right mandible resection, CORNELIO presents with several hours of substernal dull chest pain associated with diaphoresis, 10/10, mildly relieved by nitro and morphine, radiates to back. Denies any sick contacts or recent travels States that he was in his usual state of healthy when he started experiencing the pain. In the ED, son and wife at bedside. CTA chest negative for dissection. Troponin elevated to 5.60 with CK 3652. Pt continued to experience chest pain that was mildly relieved by morphine. TTE showed EF 35-40%, basal and mid anterolateral wall, mid and apical inferior wall, and basal and mid inferolateral wall akinesis. Pt had urgent cardiac catheterization which showed that LIMA to courtney and stents in LAD /RCA were patent and a possibly chronically occluded OM with PCWP 32. Interventional cardiologist made recommendations for repeat CTA chest given concern for PE. Repeat CTA chest was negative for PE but showed new small b/l pleural effusions. MICU evaluated for acute CHF exacerbation in setting of renal failure. Pt developed respiratory distress, received lasix 80mg IVP, and was placed on nocturnal BiPAP. Pt was upgraded to stepdown.       REVIEW OF SYSTEMS:    CONSTITUTIONAL: No fever, weight loss, or fatigue  CARDIOVASCULAR: No chest pain, palpitations       Vital Signs Last 24 Hrs  T(C): 37.2 (2017 11:53), Max: 37.5 (2017 00:00)  T(F): 98.9 (2017 11:53), Max: 99.5 (2017 00:00)  HR: 55 (2017 11:33) (55 - 58)  BP: 88/56 (2017 11:33) (82/56 - 120/67)  BP(mean): 68 (2017 11:33) (66 - 68)  RR: 27 (2017 11:33) (19 - 29)  SpO2: 94% (:) (93% - 100%)    PHYSICAL EXAM:    GENERAL: NAD   HEENT: PERRL, +EOMI, MMM  CHEST/LUNG: bibasilar fine rales  HEART: S1S2+, Regular rate and rhythm   ABDOMEN: Soft, Nontender, Nondistended; Bowel sounds present     LABS:                        15.4   12.9  )-----------( 131      ( 2017 11:35 )             44.6     11    135  |  97<L>  |  63.0<H>  ----------------------------<  92  4.7   |  23.0  |  3.08<H>    Ca    8.7      2017 11:35  Phos  2.5       Mg     2.0             Urinalysis Basic - ( 31 Oct 2017 17:29 )    Color: Pale Yellow / Appearance: Clear / S.005 / pH: x  Gluc: x / Ketone: Negative  / Bili: Negative / Urobili: Negative mg/dL   Blood: x / Protein: Negative mg/dL / Nitrite: Negative   Leuk Esterase: Negative / RBC: 0-2 /HPF / WBC 0-2   Sq Epi: x / Non Sq Epi: Occasional / Bacteria: x          MEDICATIONS  (STANDING):  aspirin enteric coated 81 milliGRAM(s) Oral daily  atorvastatin 40 milliGRAM(s) Oral at bedtime  carvedilol 6.25 milliGRAM(s) Oral every 12 hours  clopidogrel Tablet 75 milliGRAM(s) Oral daily  furosemide   Injectable 40 milliGRAM(s) IV Push daily  heparin  Injectable 5000 Unit(s) SubCutaneous every 8 hours  pantoprazole    Tablet 40 milliGRAM(s) Oral before breakfast    MEDICATIONS  (PRN):  morphine  - Injectable 2.5 milliGRAM(s) IV Push every 4 hours PRN Severe Pain (7 - 10)  ondansetron Injectable 4 milliGRAM(s) IV Push every 6 hours PRN Nausea and/or Vomiting      RADIOLOGY & ADDITIONAL TESTS: no

## 2018-05-05 NOTE — PROVIDER CONTACT NOTE (OTHER) - DATE AND TIME:
20-May-2017 17:57
20-May-2017 17:59
20-May-2017 10:43
20-May-2017 10:45
23-May-2017 08:49
23-May-2017 08:57
Spouse/Self

## 2018-05-19 ENCOUNTER — EMERGENCY (EMERGENCY)
Facility: HOSPITAL | Age: 83
LOS: 0 days | Discharge: NURSING, REHAB OR LONG-TERM CARE | End: 2018-05-19
Attending: EMERGENCY MEDICINE | Admitting: EMERGENCY MEDICINE
Payer: MEDICARE

## 2018-05-19 VITALS
SYSTOLIC BLOOD PRESSURE: 158 MMHG | DIASTOLIC BLOOD PRESSURE: 68 MMHG | TEMPERATURE: 98 F | OXYGEN SATURATION: 100 % | RESPIRATION RATE: 17 BRPM

## 2018-05-19 VITALS
TEMPERATURE: 98 F | SYSTOLIC BLOOD PRESSURE: 165 MMHG | OXYGEN SATURATION: 98 % | DIASTOLIC BLOOD PRESSURE: 89 MMHG | HEART RATE: 81 BPM | RESPIRATION RATE: 16 BRPM

## 2018-05-19 DIAGNOSIS — Z91.81 HISTORY OF FALLING: ICD-10-CM

## 2018-05-19 DIAGNOSIS — Z90.12 ACQUIRED ABSENCE OF LEFT BREAST AND NIPPLE: Chronic | ICD-10-CM

## 2018-05-19 DIAGNOSIS — F03.90 UNSPECIFIED DEMENTIA WITHOUT BEHAVIORAL DISTURBANCE: ICD-10-CM

## 2018-05-19 DIAGNOSIS — C79.31 SECONDARY MALIGNANT NEOPLASM OF BRAIN: ICD-10-CM

## 2018-05-19 DIAGNOSIS — I50.9 HEART FAILURE, UNSPECIFIED: ICD-10-CM

## 2018-05-19 DIAGNOSIS — Z96.659 PRESENCE OF UNSPECIFIED ARTIFICIAL KNEE JOINT: Chronic | ICD-10-CM

## 2018-05-19 PROCEDURE — 99284 EMERGENCY DEPT VISIT MOD MDM: CPT

## 2018-05-19 PROCEDURE — 70450 CT HEAD/BRAIN W/O DYE: CPT | Mod: 26

## 2018-05-19 PROCEDURE — 72125 CT NECK SPINE W/O DYE: CPT | Mod: 26

## 2018-05-19 NOTE — ED PROVIDER NOTE - PROGRESS NOTE DETAILS
pt has ct evidence of metastatic dx, with out acute findings, has remained clnically stable, will d/c to NH

## 2018-05-19 NOTE — ED PROVIDER NOTE - OBJECTIVE STATEMENT
This is a 89 ty f, at baseline per NH, hx metastatic bran ca, sent to ed for eval due to fall from bed. Pt can not articulate further hx

## 2018-05-19 NOTE — ED ADULT NURSE NOTE - CHIEF COMPLAINT QUOTE
patient from [facility] reported unwitnessed fall oob, with question of hitting head.  unknown loc.  patient is not on anticoag. h/o renal ca with mets to brain, baseline ams.  ems reported c/o right lateral chest wall pain with transfer.  no visible signs of trauma at this time.   trauma alert called from triage

## 2018-05-19 NOTE — ED ADULT TRIAGE NOTE - CHIEF COMPLAINT QUOTE
patient from [facility] reported unwitnessed fall oob, with question of hitting head.  unknown loc.  patient is not on anticoag. h/o renal ca with mets to brain, baseline ams.  trauma alert called from triage patient from [facility] reported unwitnessed fall oob, with question of hitting head.  unknown loc.  patient is not on anticoag. h/o renal ca with mets to brain, baseline ams.  ems reported c/o right lateral chest wall pain with transfer.  no visible signs of trauma at this time.   trauma alert called from triage

## 2018-08-19 NOTE — DIETITIAN INITIAL EVALUATION ADULT. - NUTRITIONGOAL OUTCOME1
Problem: Venous Thromboembolism (VTW)/Deep Vein Thrombosis (DVT) Prevention:  Goal: Patient will participate in Venous Thrombosis (VTE)/Deep Vein Thrombosis (DVT)Prevention Measures  Outcome: PROGRESSING AS EXPECTED   08/18/18 2000 08/18/18 2200   OTHER   Risk Assessment Score 2 --    VTE RISK Moderate --    Pharmacologic Prophylaxis Used LMWH: Enoxaparin(Lovenox) --    Mechanical/VTE Prophylaxis   Mechanical Prophylaxis  --  SCDs, Sequential Compression Device   SCDs, Sequential Compression Device --  On       Problem: Pain Management  Goal: Pain level will decrease to patient's comfort goal  Outcome: PROGRESSING AS EXPECTED   08/19/18 0409   OTHER   Non Verbal Scale  Calm;Sleeping;Unlabored Breathing   Pt educated to call for pain medication when needed       Patient will consume >75% of meals daily

## 2018-10-19 NOTE — INPATIENT CERTIFICATION FOR MEDICARE PATIENTS - PHYSICIAN CONCUR
I concur with the Admission Order and I certify that services are provided in accordance with Section 42 CFR § 412.3
(4) rarely moist

## 2018-11-10 ENCOUNTER — INPATIENT (INPATIENT)
Facility: HOSPITAL | Age: 83
LOS: 5 days | Discharge: HOME | End: 2018-11-16
Attending: INTERNAL MEDICINE | Admitting: INTERNAL MEDICINE
Payer: MEDICARE

## 2018-11-10 VITALS
RESPIRATION RATE: 20 BRPM | DIASTOLIC BLOOD PRESSURE: 90 MMHG | OXYGEN SATURATION: 94 % | HEART RATE: 80 BPM | SYSTOLIC BLOOD PRESSURE: 162 MMHG

## 2018-11-10 DIAGNOSIS — Z96.659 PRESENCE OF UNSPECIFIED ARTIFICIAL KNEE JOINT: Chronic | ICD-10-CM

## 2018-11-10 DIAGNOSIS — Z90.12 ACQUIRED ABSENCE OF LEFT BREAST AND NIPPLE: Chronic | ICD-10-CM

## 2018-11-10 LAB
ADD ON TEST-SPECIMEN IN LAB: SIGNIFICANT CHANGE UP
ADD ON TEST-SPECIMEN IN LAB: SIGNIFICANT CHANGE UP
ALBUMIN SERPL ELPH-MCNC: 3 G/DL — LOW (ref 3.3–5)
ALP SERPL-CCNC: 120 U/L — SIGNIFICANT CHANGE UP (ref 40–120)
ALT FLD-CCNC: 17 U/L — SIGNIFICANT CHANGE UP (ref 12–78)
ANION GAP SERPL CALC-SCNC: 9 MMOL/L — SIGNIFICANT CHANGE UP (ref 5–17)
APPEARANCE UR: ABNORMAL
APTT BLD: 25.5 SEC — LOW (ref 27.5–36.3)
AST SERPL-CCNC: 15 U/L — SIGNIFICANT CHANGE UP (ref 15–37)
BACTERIA # UR AUTO: ABNORMAL
BASOPHILS # BLD AUTO: 0.05 K/UL — SIGNIFICANT CHANGE UP (ref 0–0.2)
BASOPHILS NFR BLD AUTO: 0.5 % — SIGNIFICANT CHANGE UP (ref 0–2)
BILIRUB SERPL-MCNC: 0.9 MG/DL — SIGNIFICANT CHANGE UP (ref 0.2–1.2)
BILIRUB UR-MCNC: NEGATIVE — SIGNIFICANT CHANGE UP
BLD GP AB SCN SERPL QL: SIGNIFICANT CHANGE UP
BUN SERPL-MCNC: 15 MG/DL — SIGNIFICANT CHANGE UP (ref 7–23)
CALCIUM SERPL-MCNC: 9.3 MG/DL — SIGNIFICANT CHANGE UP (ref 8.5–10.1)
CHLORIDE SERPL-SCNC: 104 MMOL/L — SIGNIFICANT CHANGE UP (ref 96–108)
CO2 SERPL-SCNC: 27 MMOL/L — SIGNIFICANT CHANGE UP (ref 22–31)
COLOR SPEC: YELLOW — SIGNIFICANT CHANGE UP
CREAT SERPL-MCNC: 0.82 MG/DL — SIGNIFICANT CHANGE UP (ref 0.5–1.3)
DIFF PNL FLD: ABNORMAL
EOSINOPHIL # BLD AUTO: 0.18 K/UL — SIGNIFICANT CHANGE UP (ref 0–0.5)
EOSINOPHIL NFR BLD AUTO: 1.7 % — SIGNIFICANT CHANGE UP (ref 0–6)
EPI CELLS # UR: SIGNIFICANT CHANGE UP
GLUCOSE SERPL-MCNC: 99 MG/DL — SIGNIFICANT CHANGE UP (ref 70–99)
GLUCOSE UR QL: NEGATIVE MG/DL — SIGNIFICANT CHANGE UP
HCT VFR BLD CALC: 40.7 % — SIGNIFICANT CHANGE UP (ref 34.5–45)
HGB BLD-MCNC: 12.5 G/DL — SIGNIFICANT CHANGE UP (ref 11.5–15.5)
IMM GRANULOCYTES NFR BLD AUTO: 1 % — SIGNIFICANT CHANGE UP (ref 0–1.5)
INR BLD: 1.15 RATIO — SIGNIFICANT CHANGE UP (ref 0.88–1.16)
KETONES UR-MCNC: NEGATIVE — SIGNIFICANT CHANGE UP
LEUKOCYTE ESTERASE UR-ACNC: ABNORMAL
LYMPHOCYTES # BLD AUTO: 0.87 K/UL — LOW (ref 1–3.3)
LYMPHOCYTES # BLD AUTO: 8.2 % — LOW (ref 13–44)
MCHC RBC-ENTMCNC: 28 PG — SIGNIFICANT CHANGE UP (ref 27–34)
MCHC RBC-ENTMCNC: 30.7 GM/DL — LOW (ref 32–36)
MCV RBC AUTO: 91.3 FL — SIGNIFICANT CHANGE UP (ref 80–100)
MONOCYTES # BLD AUTO: 1.03 K/UL — HIGH (ref 0–0.9)
MONOCYTES NFR BLD AUTO: 9.7 % — SIGNIFICANT CHANGE UP (ref 2–14)
NEUTROPHILS # BLD AUTO: 8.34 K/UL — HIGH (ref 1.8–7.4)
NEUTROPHILS NFR BLD AUTO: 78.9 % — HIGH (ref 43–77)
NITRITE UR-MCNC: NEGATIVE — SIGNIFICANT CHANGE UP
NRBC # BLD: 0 /100 WBCS — SIGNIFICANT CHANGE UP (ref 0–0)
PH UR: 6 — SIGNIFICANT CHANGE UP (ref 5–8)
PLATELET # BLD AUTO: 235 K/UL — SIGNIFICANT CHANGE UP (ref 150–400)
POTASSIUM SERPL-MCNC: 3.5 MMOL/L — SIGNIFICANT CHANGE UP (ref 3.5–5.3)
POTASSIUM SERPL-SCNC: 3.5 MMOL/L — SIGNIFICANT CHANGE UP (ref 3.5–5.3)
PROT SERPL-MCNC: 7.2 GM/DL — SIGNIFICANT CHANGE UP (ref 6–8.3)
PROT UR-MCNC: 30 MG/DL
PROTHROM AB SERPL-ACNC: 12.8 SEC — SIGNIFICANT CHANGE UP (ref 10–12.9)
RBC # BLD: 4.46 M/UL — SIGNIFICANT CHANGE UP (ref 3.8–5.2)
RBC # FLD: 16.1 % — HIGH (ref 10.3–14.5)
RBC CASTS # UR COMP ASSIST: ABNORMAL /HPF (ref 0–4)
SODIUM SERPL-SCNC: 140 MMOL/L — SIGNIFICANT CHANGE UP (ref 135–145)
SP GR SPEC: 1.01 — SIGNIFICANT CHANGE UP (ref 1.01–1.02)
TROPONIN I SERPL-MCNC: 0.02 NG/ML — SIGNIFICANT CHANGE UP (ref 0.01–0.04)
TYPE + AB SCN PNL BLD: SIGNIFICANT CHANGE UP
UROBILINOGEN FLD QL: NEGATIVE MG/DL — SIGNIFICANT CHANGE UP
WBC # BLD: 10.58 K/UL — HIGH (ref 3.8–10.5)
WBC # FLD AUTO: 10.58 K/UL — HIGH (ref 3.8–10.5)
WBC UR QL: >50

## 2018-11-10 PROCEDURE — 93010 ELECTROCARDIOGRAM REPORT: CPT

## 2018-11-10 PROCEDURE — 99285 EMERGENCY DEPT VISIT HI MDM: CPT

## 2018-11-10 PROCEDURE — 71045 X-RAY EXAM CHEST 1 VIEW: CPT | Mod: 26

## 2018-11-10 PROCEDURE — 70450 CT HEAD/BRAIN W/O DYE: CPT | Mod: 26

## 2018-11-10 RX ORDER — CEFTRIAXONE 500 MG/1
1000 INJECTION, POWDER, FOR SOLUTION INTRAMUSCULAR; INTRAVENOUS EVERY 24 HOURS
Qty: 0 | Refills: 0 | Status: DISCONTINUED | OUTPATIENT
Start: 2018-11-10 | End: 2018-11-13

## 2018-11-10 RX ORDER — DEXAMETHASONE 0.5 MG/5ML
10 ELIXIR ORAL ONCE
Qty: 0 | Refills: 0 | Status: COMPLETED | OUTPATIENT
Start: 2018-11-10 | End: 2018-11-10

## 2018-11-10 RX ORDER — DOCUSATE SODIUM 100 MG
100 CAPSULE ORAL DAILY
Qty: 0 | Refills: 0 | Status: DISCONTINUED | OUTPATIENT
Start: 2018-11-10 | End: 2018-11-12

## 2018-11-10 RX ORDER — DEXAMETHASONE 0.5 MG/5ML
4 ELIXIR ORAL EVERY 6 HOURS
Qty: 0 | Refills: 0 | Status: DISCONTINUED | OUTPATIENT
Start: 2018-11-10 | End: 2018-11-11

## 2018-11-10 RX ORDER — SIMVASTATIN 20 MG/1
10 TABLET, FILM COATED ORAL AT BEDTIME
Qty: 0 | Refills: 0 | Status: DISCONTINUED | OUTPATIENT
Start: 2018-11-10 | End: 2018-11-12

## 2018-11-10 RX ORDER — LEVETIRACETAM 250 MG/1
1000 TABLET, FILM COATED ORAL ONCE
Qty: 0 | Refills: 0 | Status: COMPLETED | OUTPATIENT
Start: 2018-11-10 | End: 2018-11-10

## 2018-11-10 RX ORDER — ATENOLOL 25 MG/1
25 TABLET ORAL DAILY
Qty: 0 | Refills: 0 | Status: DISCONTINUED | OUTPATIENT
Start: 2018-11-10 | End: 2018-11-11

## 2018-11-10 RX ORDER — DULOXETINE HYDROCHLORIDE 30 MG/1
30 CAPSULE, DELAYED RELEASE ORAL DAILY
Qty: 0 | Refills: 0 | Status: DISCONTINUED | OUTPATIENT
Start: 2018-11-10 | End: 2018-11-12

## 2018-11-10 RX ORDER — FOLIC ACID 0.8 MG
1 TABLET ORAL DAILY
Qty: 0 | Refills: 0 | Status: DISCONTINUED | OUTPATIENT
Start: 2018-11-10 | End: 2018-11-12

## 2018-11-10 RX ORDER — CEFTRIAXONE 500 MG/1
1 INJECTION, POWDER, FOR SOLUTION INTRAMUSCULAR; INTRAVENOUS ONCE
Qty: 0 | Refills: 0 | Status: DISCONTINUED | OUTPATIENT
Start: 2018-11-10 | End: 2018-11-10

## 2018-11-10 RX ORDER — LEVETIRACETAM 250 MG/1
750 TABLET, FILM COATED ORAL
Qty: 0 | Refills: 0 | Status: DISCONTINUED | OUTPATIENT
Start: 2018-11-10 | End: 2018-11-11

## 2018-11-10 RX ORDER — HYDRALAZINE HCL 50 MG
5 TABLET ORAL EVERY 6 HOURS
Qty: 0 | Refills: 0 | Status: DISCONTINUED | OUTPATIENT
Start: 2018-11-10 | End: 2018-11-11

## 2018-11-10 RX ORDER — ACETAMINOPHEN 500 MG
650 TABLET ORAL EVERY 6 HOURS
Qty: 0 | Refills: 0 | Status: DISCONTINUED | OUTPATIENT
Start: 2018-11-10 | End: 2018-11-16

## 2018-11-10 RX ORDER — ONDANSETRON 8 MG/1
4 TABLET, FILM COATED ORAL EVERY 6 HOURS
Qty: 0 | Refills: 0 | Status: DISCONTINUED | OUTPATIENT
Start: 2018-11-10 | End: 2018-11-16

## 2018-11-10 RX ORDER — CEFTRIAXONE 500 MG/1
1000 INJECTION, POWDER, FOR SOLUTION INTRAMUSCULAR; INTRAVENOUS ONCE
Qty: 0 | Refills: 0 | Status: COMPLETED | OUTPATIENT
Start: 2018-11-10 | End: 2018-11-10

## 2018-11-10 RX ORDER — IPRATROPIUM/ALBUTEROL SULFATE 18-103MCG
3 AEROSOL WITH ADAPTER (GRAM) INHALATION EVERY 12 HOURS
Qty: 0 | Refills: 0 | Status: DISCONTINUED | OUTPATIENT
Start: 2018-11-10 | End: 2018-11-16

## 2018-11-10 RX ORDER — FUROSEMIDE 40 MG
40 TABLET ORAL DAILY
Qty: 0 | Refills: 0 | Status: DISCONTINUED | OUTPATIENT
Start: 2018-11-10 | End: 2018-11-16

## 2018-11-10 RX ADMIN — LEVETIRACETAM 400 MILLIGRAM(S): 250 TABLET, FILM COATED ORAL at 21:03

## 2018-11-10 RX ADMIN — CEFTRIAXONE 1000 MILLIGRAM(S): 500 INJECTION, POWDER, FOR SOLUTION INTRAMUSCULAR; INTRAVENOUS at 20:14

## 2018-11-10 RX ADMIN — Medication 102 MILLIGRAM(S): at 22:10

## 2018-11-10 NOTE — ED PROVIDER NOTE - OBJECTIVE STATEMENT
89 y/o female with PMHx of Afib, dementia, CHF, s/p total knee replacement, s/p mastectomy (left) presents to the ED BIBEMS sent by [facility] for period of unresponsiveness. Denies cough. Hx is limited because pt is nonverbal.

## 2018-11-10 NOTE — ED ADULT NURSE NOTE - NS ED NURSE REPORT GIVEN DT
[facility] EMERGENCY DEPT 
[address] [id number] [phone] Work/School Note Date: 7/19/2018 To Whom It May concern: 
 
Any Albert was seen and treated today in the emergency room by the following provider(s): 
Attending Provider: Mckay Tinoco MD 
Physician Assistant: JAIME Moreno. Any Albert may return to work on [phone] or sooner if symptoms improve. Sincerely, JAIME Moreno 
 
 
 
 10-Nov-2018 22:51

## 2018-11-10 NOTE — H&P ADULT - ASSESSMENT
89 y.o. 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 Pt able to shake head or nod to simple questions and able to follow simple commands such as squeezing hand, lifting arms/legs, or smiling. Pt is nonverbal. Pt denies fever, chills, CP, abd pain, diarrhea, dysuria. Pt is poor historian.    #lethargy likely due to brain hemorrhage  #known brain mets, renal lesion  #hx left breast cancer s/p mastectomy  -admit to step down  -neuro checks q2  -per neurosx recommendations: keep SBP<160, keppra 750mg BID, decadron 4q6, EEG  -EEG  -neuro consult  -repeat CT head in AM to eval for change  -f/u neurosurg    #UTI  -cont ceftriaxone  -f/u cultures    #CHF, diastolic, severe pulm hypertension  -I/O, daily weight  -echo (5/22/17) EF 60-65% mild MR, mod to severe TR, severe pHTN  -cont lasix    #AFib  -EKG: AFib HR 72, L posterior fascicular block, Q in V1-2, III, aVF  -cont BB  -no ASA due to current brain bleed    #advanced care planning  -advanced directives signed in ED with pt's stefania Cueva Prior [phone]  -pt is DNR/DNI    IMPROVE VTE Individual Risk Assessment    RISK                                                                Points    [  ] Previous VTE                                                  3    [  ] Thrombophilia                                               2    [  ] Lower limb paralysis                                      2        (unable to hold up >15 seconds)      [x  ] Current Cancer                                              2         (within 6 months)    [ x ] Immobilization > 24 hrs                                1    [  ] ICU/CCU stay > 24 hours                              1    [ x ] Age > 60                                                      1    IMPROVE VTE Score ___4______

## 2018-11-10 NOTE — ED PROVIDER NOTE - ATTENDING CONTRIBUTION TO CARE
Attending Contribution to Care: I, Kristine Maradiaga, performed the initial face to face bedside interview with this patient regarding history of present illness, review of symptoms and relevant past medical, social and family history.  I completed an independent physical examination.  I was the initial provider who evaluated this patient. I have signed out the follow up of any pending tests (i.e. labs, radiological studies) to the ACP.  I have communicated the patient’s plan of care and disposition with the ACP.

## 2018-11-10 NOTE — CONSULT NOTE ADULT - SUBJECTIVE AND OBJECTIVE BOX
Neurosurgery:    89 yo female DNR/DNI h/o AFib not on A/C, + ASA, Severe Dementia, CAD HTN CHF w/ SOB admissions in the past.  Pt with past brain tumor s/p cyber knife suspected metastatic lesion vs meningioma of the brain, along with Clival / Sella based lesion. Pt w/ h/o breast CA s/p mastectomy 30 years, renal mass hx and presently residing at [facility].  Pt p/w change in MS per nursing staff at the [facility]. Pt has severe dementia and is non verbal which is her baseline as per NH records, and therefore cannot provide full history. Grandson at bedside - declared HCP.  States no agressive measures, DNR/I status and prefers to have patient taken back to [facility] if possible.  HCP was informed of planned observation re: hemorrhage in tumor bed for 12-24 hours and repeat imaging in am hours.  Pt 1 year ago sent to [facility] with needs for constant care, but remained stable re: CHF/SOB status along with metastatic? vs Brain tumor progression.  ER staff loaded with Keppra 1 gram and will reverse with Platelets 1 donor pack for ASA use.  Family expressed no aggressive measures.      PAST MEDICAL & SURGICAL HISTORY:  Dementia  CHF (congestive heart failure)  Left Inferior Frontal Brain mass - meningioma vs metastasis  H/O total knee replacement: 15 YEARS AGO  H/O mastectomy, left: 30 YEARS AGO      FAMILY HISTORY:  No pertinent family history in first degree relatives       Social Hx:  Nonsmoker, no drug or alcohol use    Allergies  No Known Allergies           ROS: Pertinent positives in HPI, all other ROS were reviewed and are negative.      Vital Signs Last 24 Hrs  T(C): 37.4 (10 Nov 2018 18:11), Max: 37.4 (10 Nov 2018 18:11)  T(F): 99.3 (10 Nov 2018 18:11), Max: 99.3 (10 Nov 2018 18:11)  HR: 80 (10 Nov 2018 17:31) (80 - 80)  BP: 162/90 (10 Nov 2018 17:31) (162/90 - 162/90)  BP(mean): --  RR: 20 (10 Nov 2018 18:11) (20 - 20)  SpO2: 98% (10 Nov 2018 18:11) (94% - 98%)    Labs:                        12.5   10.58 )-----------( 235      ( 10 Nov 2018 18:19 )             40.7     11-10    140  |  104  |  15  ----------------------------<  99  3.5   |  27  |  0.82    Ca    9.3      10 Nov 2018 18:19    TPro  7.2  /  Alb  3.0<L>  /  TBili  0.9  /  DBili  x   /  AST  15  /  ALT  17  /  AlkPhos  120  11-10    Coags Pending    Radiology report:  - CT head:    Redemonstration of a left inferior frontal solid and cystic lesion with   interval increase in hemorrhagic component since 5/19/2018. Associated   left frontal vasogenic edema. No midline shift or hydrocephalus.    Redemonstration of a destructive sellar/skull base lesion.    Further evaluation with contrast-enhanced MRI may be obtained, no   contraindications exist.    Dr. Harvey discussed the above findings with Dr. Maradiaga at 8:06 PM on   11/10/2018 with read back.      - MRI Brain May 2017 -  1. A solid and cystic lesion is present in the anterior inferior left   frontal region as described. Leading considerations include a meningioma   versus a metastasis (given the patient's clinical history). Other mass   lesions can't be excluded.  2. An invasive heterogeneously enhancing lesion involves the sella, right   cavernous sinus, and skull base. Some considerations include an invasive   pituitary macroadenoma, metastasis, or invasive meningioma. A defect   involves the nasal septum raising possibility of prior transsphenoidal   surgery.      Physical Exam:  Constitutional: Awake / alert Oriented x 2 (self / hospital)  HEENT: PERRLA, EOMI  Neck: Supple  Respiratory: Breath sounds are clear bilaterally  Cardiovascular: S1 and S2, regular rhythm  Gastrointestinal: Soft, NT/ND  Extremities:  no edema  Vascular: No carotid Bruit  Musculoskeletal: no joint swelling/tenderness, no abnormal movements      Neurological Exam:  HF: A x O x 2, [place], Smiling no distress. appropriately interactive, normal affect, speech fluent, no aphasia or paraphasic errors. Naming /repetition intact   CN: PERRL, EOMI, VFF, facial sensation normal, no NLFD, tongue midline  Motor: No pronator drift, Strength 5/5 in all 4 ext, RUE 4+/5 RLE 4+/5 moves fingers like playing a piano without difficulty. Shakes hand of TATIANNA appropriately.   Normal bulk and tone, no tremor, rigidity or bradykinesia  Sens: Intact to light touch  Reflexes: Symmetric and normal, downgoing toes b/l  Gait/Balance: Cannot test    A/P:  89 yo female DNR/DNI h/o AFib not on A/C, + ASA, Severe Dementia, CAD HTN CHF w/ SOB admissions in the past.  Pt with past brain tumor s/p cyber knife suspected metastatic lesion vs meningioma of the brain, along with sella/clival lesion. + h/o breast CA s/p mastectomy 30 years, renal mass hx p/w change in MS. New hemorrhage in tumor bed identified requiring observation for 12-24 hours and repeat imaging / seizure prophylaxis / precautions recommended.  Pt 1 year ago sent to [facility] with needs for constant care, but remained stable re: CHF/SOB status along with metastatic? vs Brain tumor progression.  ER staff loaded with Keppra 1 gram, Decadron dosing, and will reverse with Platelets 1 donor pack for ASA use.  Family expressed no aggressive measures.    - Keep SBP < 160, may need push of Hydralazine 10mg ivp or labetalol 10mg IVP prn  - Neuro checks q 4 hour  - Non surgical bleed identified, repeat if change in MS or in 12 hours (am study)  - 1 donor pack plateletes being given for ASA use  - Continue Keppra 750mg BID  - Decadron 4q6 for associated lesion edema x 24 hours, also present on prior study.  - Neurology consult recommended for outpt seizure medication needs  - AM EEG  - Admit to Medical / Hospitalist service accordingly per Dr. Cordoba as no surgical options to be considered - attending neurosurgeon    Total Critical Care Time spent: > 48 minutes in evaluating patient, present and past medical records / imaging studies, and providing / implementing neuro axis protective measures.

## 2018-11-10 NOTE — ED PROVIDER NOTE - CARE PLAN
Principal Discharge DX:	Altered mental status, unspecified altered mental status type  Secondary Diagnosis:	Urinary tract infection without hematuria, site unspecified  Secondary Diagnosis:	Intracranial hemorrhage

## 2018-11-10 NOTE — ED ADULT NURSE NOTE - NSIMPLEMENTINTERV_GEN_ALL_ED
Implemented All Universal Safety Interventions:  [place] to call system. Call bell, personal items and telephone within reach. Instruct patient to call for assistance. Room bathroom lighting operational. Non-slip footwear when patient is off stretcher. Physically safe environment: no spills, clutter or unnecessary equipment. Stretcher in lowest position, wheels locked, appropriate side rails in place.

## 2018-11-10 NOTE — ED PROVIDER NOTE - PROGRESS NOTE DETAILS
89 y/o female with PMHx of Afib, dementia, CHF, s/p total knee replacement, s/p mastectomy (left) presents to the ED BIBEMS sent by [facility] for period of unresponsiveness. Hx limited as pt is nonverbal. -Jitendra Justin PA-C Farideh ANDREWS for ED attending, Dr. Maradiaga: healthcare proxy will be contacted. CT head showing frontal hemorrhage likely associated with known metastatic tumor. Dr. Cordoba on for neurosurgery paged. Coags added to labs, Dexamethasone and Heparin ordered. Platelets ordered as pt is on ASA. Spoke with neurosurgical AKASH Garcia who will come evaluate patient. -Jitendra Justin PA-C Pt evaluated by neurosurgery, pt will be nonoperative, recommends medicine admission to [facility], q4 neuro checks, repeat ct in the am. -Jitendra Justin PA-C Farideh ANDREWS for ED attending, Dr. Maradiaga: healthcare proxy will be contacted. CT head showing frontal hemorrhage likely associated with known metastatic tumor. Dr. Cordoba on for neurosurgery paged. Coags added to labs, Dexamethasone and Keppra ordered. Platelets ordered as pt is on ASA. DNR signed by patients healthcare proxy. -Jitendra Justin PA-C Case discussed with Dr. Michael who will admit pt. -Jitendra Justin PA-C Kristine Maradiaga, DO:  Discussed plan for admission with patient HCP.  He is aware patient needed platelets for reversal of ICH as patient is on aspirin and consented to platelets.  Physically in department signed DNR/DNI form.  Blood consent form signed by me and noted grandson/HCP on form as consent in person and on phone.  Patient contacted via phone to clarify once again the DNR/DNI and the blood product transfusion and consented to this as a part of her care.

## 2018-11-10 NOTE — ED PROVIDER NOTE - CONSTITUTIONAL, MLM
normal... Well appearing, well nourished, awake, alert, oriented to person, place, time/situation and in no apparent distress. smiles, follows commands, able to make eye contact and track in the room +nonverbal

## 2018-11-10 NOTE — ED ADULT TRIAGE NOTE - CHIEF COMPLAINT QUOTE
Pt sent from [facility] for period of unresponsiveness, lethargy as per EMS with low heart rate as per transfer note. Pt responsive on arrival to voice

## 2018-11-10 NOTE — H&P ADULT - HISTORY OF PRESENT ILLNESS
89 y.o. 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 Pt able to shake head or nod to simple questions and able to follow simple commands such as squeezing hand, lifting arms/legs, or smiling. Pt is nonverbal. Pt denies fever, chills, CP, abd pain, diarrhea, dysuria. Pt is poor historian.    PMH: as above  PSH: as above and TKR  Social Hx: lives at [facility], no tobacco, EtOH  Family Hx: unable to obtain due to dementia, current condition  ROS: unable to obtain due to dementia, current condition

## 2018-11-10 NOTE — ED PROVIDER NOTE - ENMT, MLM
Airway patent, Nasal mucosa clear. Mouth with normal mucosa. Throat has no vesicles, no oropharyngeal exudates and uvula is midline. +dry mucous membranes

## 2018-11-10 NOTE — ED ADULT NURSE NOTE - CHIEF COMPLAINT QUOTE
Pt sent from [facility] for period of unresponsiveness, lethargy as per EMS with low heart rate as per transfer note. Pt responsive on arrival to voice

## 2018-11-10 NOTE — H&P ADULT - NSHPPHYSICALEXAM_GEN_ALL_CORE
Vital Signs Last 24 Hrs  T(C): 37.4 (10 Nov 2018 18:11), Max: 37.4 (10 Nov 2018 18:11)  T(F): 99.3 (10 Nov 2018 18:11), Max: 99.3 (10 Nov 2018 18:11)  HR: 80 (10 Nov 2018 17:31) (80 - 80)  BP: 162/90 (10 Nov 2018 17:31) (162/90 - 162/90)  BP(mean): --  RR: 20 (10 Nov 2018 18:11) (20 - 20)  SpO2: 98% (10 Nov 2018 18:11) (94% - 98%)    GEN: appears comfortable  Neuro: Alert, nods/shakes head to simple command and questions, CN appears nonfocal, able to lift arms and legs at command, sensation intact  HEENT: NC/AT, EOMI, pupils symmetric  Neck: no thyroidmegaly, no JVD  Cardiovascular: S1S2 present, regular rhythm, no murmur  Respiratory: breath sounds normal bilaterally, no wheezing, no rales, no rhonchi  Gastrointestinal: bowel sounds normal, soft, no abdominal tenderness  Musculoskeletal: no muscle tenderness  Extremities: +b/l edema  Skin: No rash

## 2018-11-10 NOTE — ED PROVIDER NOTE - MEDICAL DECISION MAKING DETAILS
Plan for EKG, CT head, labs, urine, reassess. Pt likely to be admitted for syncope vs seizure as she was unresponsive in assisted living.

## 2018-11-10 NOTE — ED PROVIDER NOTE - MUSCULOSKELETAL, MLM
Spine appears normal, no muscle or joint tenderness +moves upper extremities with 5/5 strength, lower extremities weak and 3/5 in strength bilaterally

## 2018-11-11 LAB
ANION GAP SERPL CALC-SCNC: 7 MMOL/L — SIGNIFICANT CHANGE UP (ref 5–17)
BASOPHILS # BLD AUTO: 0.02 K/UL — SIGNIFICANT CHANGE UP (ref 0–0.2)
BASOPHILS NFR BLD AUTO: 0.2 % — SIGNIFICANT CHANGE UP (ref 0–2)
BUN SERPL-MCNC: 14 MG/DL — SIGNIFICANT CHANGE UP (ref 7–23)
CALCIUM SERPL-MCNC: 9.7 MG/DL — SIGNIFICANT CHANGE UP (ref 8.5–10.1)
CHLORIDE SERPL-SCNC: 105 MMOL/L — SIGNIFICANT CHANGE UP (ref 96–108)
CO2 SERPL-SCNC: 31 MMOL/L — SIGNIFICANT CHANGE UP (ref 22–31)
CREAT SERPL-MCNC: 0.73 MG/DL — SIGNIFICANT CHANGE UP (ref 0.5–1.3)
CULTURE RESULTS: SIGNIFICANT CHANGE UP
EOSINOPHIL # BLD AUTO: 0 K/UL — SIGNIFICANT CHANGE UP (ref 0–0.5)
EOSINOPHIL NFR BLD AUTO: 0 % — SIGNIFICANT CHANGE UP (ref 0–6)
GLUCOSE SERPL-MCNC: 145 MG/DL — HIGH (ref 70–99)
HCT VFR BLD CALC: 39.7 % — SIGNIFICANT CHANGE UP (ref 34.5–45)
HGB BLD-MCNC: 12.6 G/DL — SIGNIFICANT CHANGE UP (ref 11.5–15.5)
IMM GRANULOCYTES NFR BLD AUTO: 1.5 % — SIGNIFICANT CHANGE UP (ref 0–1.5)
LYMPHOCYTES # BLD AUTO: 0.38 K/UL — LOW (ref 1–3.3)
LYMPHOCYTES # BLD AUTO: 3.2 % — LOW (ref 13–44)
MANUAL SMEAR VERIFICATION: SIGNIFICANT CHANGE UP
MCHC RBC-ENTMCNC: 28.6 PG — SIGNIFICANT CHANGE UP (ref 27–34)
MCHC RBC-ENTMCNC: 31.7 GM/DL — LOW (ref 32–36)
MCV RBC AUTO: 90 FL — SIGNIFICANT CHANGE UP (ref 80–100)
MONOCYTES # BLD AUTO: 0.15 K/UL — SIGNIFICANT CHANGE UP (ref 0–0.9)
MONOCYTES NFR BLD AUTO: 1.3 % — LOW (ref 2–14)
NEUTROPHILS # BLD AUTO: 11.13 K/UL — HIGH (ref 1.8–7.4)
NEUTROPHILS NFR BLD AUTO: 93.8 % — HIGH (ref 43–77)
NRBC # BLD: 0 /100 WBCS — SIGNIFICANT CHANGE UP (ref 0–0)
PLAT MORPH BLD: NORMAL — SIGNIFICANT CHANGE UP
PLATELET # BLD AUTO: 341 K/UL — SIGNIFICANT CHANGE UP (ref 150–400)
POTASSIUM SERPL-MCNC: 3.7 MMOL/L — SIGNIFICANT CHANGE UP (ref 3.5–5.3)
POTASSIUM SERPL-SCNC: 3.7 MMOL/L — SIGNIFICANT CHANGE UP (ref 3.5–5.3)
RBC # BLD: 4.41 M/UL — SIGNIFICANT CHANGE UP (ref 3.8–5.2)
RBC # FLD: 16.3 % — HIGH (ref 10.3–14.5)
RBC BLD AUTO: NORMAL — SIGNIFICANT CHANGE UP
SODIUM SERPL-SCNC: 143 MMOL/L — SIGNIFICANT CHANGE UP (ref 135–145)
SPECIMEN SOURCE: SIGNIFICANT CHANGE UP
WBC # BLD: 11.86 K/UL — HIGH (ref 3.8–10.5)
WBC # FLD AUTO: 11.86 K/UL — HIGH (ref 3.8–10.5)

## 2018-11-11 PROCEDURE — 99284 EMERGENCY DEPT VISIT MOD MDM: CPT

## 2018-11-11 PROCEDURE — 70450 CT HEAD/BRAIN W/O DYE: CPT | Mod: 26

## 2018-11-11 RX ORDER — LEVETIRACETAM 250 MG/1
750 TABLET, FILM COATED ORAL ONCE
Qty: 0 | Refills: 0 | Status: COMPLETED | OUTPATIENT
Start: 2018-11-11 | End: 2018-11-11

## 2018-11-11 RX ORDER — DEXAMETHASONE 0.5 MG/5ML
4 ELIXIR ORAL EVERY 6 HOURS
Qty: 0 | Refills: 0 | Status: DISCONTINUED | OUTPATIENT
Start: 2018-11-11 | End: 2018-11-13

## 2018-11-11 RX ORDER — HYDRALAZINE HCL 50 MG
10 TABLET ORAL EVERY 4 HOURS
Qty: 0 | Refills: 0 | Status: DISCONTINUED | OUTPATIENT
Start: 2018-11-11 | End: 2018-11-16

## 2018-11-11 RX ORDER — METOPROLOL TARTRATE 50 MG
2.5 TABLET ORAL EVERY 6 HOURS
Qty: 0 | Refills: 0 | Status: DISCONTINUED | OUTPATIENT
Start: 2018-11-11 | End: 2018-11-13

## 2018-11-11 RX ADMIN — LEVETIRACETAM 400 MILLIGRAM(S): 250 TABLET, FILM COATED ORAL at 11:51

## 2018-11-11 RX ADMIN — CEFTRIAXONE 1000 MILLIGRAM(S): 500 INJECTION, POWDER, FOR SOLUTION INTRAMUSCULAR; INTRAVENOUS at 21:14

## 2018-11-11 RX ADMIN — Medication 2.5 MILLIGRAM(S): at 18:03

## 2018-11-11 RX ADMIN — Medication 2.5 MILLIGRAM(S): at 11:40

## 2018-11-11 RX ADMIN — Medication 5 MILLIGRAM(S): at 10:21

## 2018-11-11 RX ADMIN — Medication 3 MILLILITER(S): at 20:05

## 2018-11-11 RX ADMIN — Medication 4 MILLIGRAM(S): at 11:40

## 2018-11-11 RX ADMIN — Medication 10 MILLIGRAM(S): at 20:01

## 2018-11-11 RX ADMIN — Medication 4 MILLIGRAM(S): at 18:37

## 2018-11-11 RX ADMIN — Medication 3 MILLILITER(S): at 08:46

## 2018-11-11 NOTE — PROGRESS NOTE ADULT - SUBJECTIVE AND OBJECTIVE BOX
PI:  89 yo female  h/o AFib not on A/C, + ASA, Severe Dementia, CAD,  HTN, CHF HFpEF ,  brain tumor s/p cyber knife -suspected metastatic lesion, renal mass, h/o  breast CA s/p mastectomy 30 years ago,  presently residing at [facility],  p/w change in MS per nursing staff at the [facility]. Pt has severe dementia and is non verbal which is her baseline as per NH records  -found to have new intraparenchymal hemorrhage in the tumor bed  -given IV steroids and IV keppra    11.11: lethargic, arousable to physical stimuli      REVIEW OF SYSTEMS:    unable to obtain due to dementia and lethargy    All other review of systems is negative unless indicated above    Vital Signs Last 24 Hrs  T(C): 36.7 (11 Nov 2018 06:18), Max: 37.4 (10 Nov 2018 18:11)  T(F): 98 (11 Nov 2018 06:18), Max: 99.3 (10 Nov 2018 18:11)  HR: 71 (11 Nov 2018 12:01) (71 - 89)  BP: 159/55 (11 Nov 2018 12:01) (124/69 - 192/47)  BP(mean): 75 (11 Nov 2018 12:01) (66 - 98)  RR: 14 (11 Nov 2018 12:01) (13 - 28)  SpO2: 100% (11 Nov 2018 12:01) (94% - 100%)    PHYSICAL EXAM:    GENERAL: NAD, Well nourished  HEENT:  NC/AT, EOMI, PERRLA, No scleral icterus, Moist mucous membranes  NECK: Supple, No JVD  CNS:  Alert & Oriented X0, Motor Strength 5/5 B/L upper and lower extremities; DTRs 2+ intact   LUNG: Normal Breath sounds, Clear to auscultation bilaterally, No rales, No rhonchi, No wheezing  HEART: RRR; No murmurs, No rubs  ABDOMEN: +BS, ST/ND/NT  GENITOURINARY: Voiding, Bladder not distended  EXTREMITIES:  2+ Peripheral Pulses, No clubbing, No cyanosis, No tibial edema  MUSCULOSKELTAL: Joints normal ROM, No TTP, No effusion  VAGINAL: deferred  SKIN: no rashes  RECTAL: deferred, not indicated  BREAST: deferred                          12.6   11.86 )-----------( 341      ( 11 Nov 2018 05:03 )             39.7     11-11    143  |  105  |  14  ----------------------------<  145<H>  3.7   |  31  |  0.73    Ca    9.7      11 Nov 2018 05:03    TPro  7.2  /  Alb  3.0<L>  /  TBili  0.9  /  DBili  x   /  AST  15  /  ALT  17  /  AlkPhos  120  11-10    Vancomycin levels:   Cultures:     MEDICATIONS  (STANDING):  ALBUTerol/ipratropium for Nebulization 3 milliLiter(s) Nebulizer every 12 hours  cefTRIAXone Injectable. 1000 milliGRAM(s) IV Push every 24 hours  dexamethasone  Injectable 4 milliGRAM(s) IV Push every 6 hours  docusate sodium 100 milliGRAM(s) Oral daily  DULoxetine 30 milliGRAM(s) Oral daily  folic acid 1 milliGRAM(s) Oral daily  furosemide    Tablet 40 milliGRAM(s) Oral daily  levETIRAcetam 750 milliGRAM(s) Oral two times a day  metoprolol tartrate Injectable 2.5 milliGRAM(s) IV Push every 6 hours  simvastatin 10 milliGRAM(s) Oral at bedtime    MEDICATIONS  (PRN):  acetaminophen   Tablet .. 650 milliGRAM(s) Oral every 6 hours PRN Temp greater or equal to 38C (100.4F), Mild Pain (1 - 3)  hydrALAZINE Injectable 10 milliGRAM(s) IV Push every 4 hours PRN for SBP>160  ondansetron Injectable 4 milliGRAM(s) IV Push every 6 hours PRN Nausea      all labs reviewed  all imaging reviewed      1. Encephalopathy due to intraparenchymal hemorrhage:  c/w IV keppra  cw IV decadron  hold all anticoagulation and antiplatelets   Neurosurgical eval appreciated: no aggressive interventions per family  repeat CT head stable  Palliative care consult    2. Pyuria r/o  UTI  -cont ceftriaxone  -f/u cultures    3. Chronic diastolic CHF/Severe pulm hypertension  -I/O, daily weight  -echo (5/22/17) EF 60-65% mild MR, mod to severe TR, severe pHTN  -cont lasix    4. AFib  -EKG: AFib HR 72, L posterior fascicular block, Q in V1-2, III, aVF  -cont BB  -no ASA due to current brain bleed    #advanced care planning  -advanced directives signed in ED with pt's stefania Cueva Prior [phone]  -pt is DNR/DNI

## 2018-11-11 NOTE — CONSULT NOTE ADULT - ASSESSMENT
89 yo female DNR/DNI with PMH of AFib (not on A/C) on ASA, Severe Dementia, CAD HTN CHF w/ SOB admissions in the past.  Pt has history of breast CA s/p mastectomy 30 years, also renal mass hx and brain tumor - suspected metastatic lesion vs meningioma brain along with Clival / Sella based lesion; s/p cyber knife surgery, presently resides at [facility]; presented to ED with change in MS per nursing staff at the [facility]. Pt has severe dementia and is non verbal at baseline per NH records, is unable to provide history. In ED, CT head revealed new hyperdensity within the medial left frontal lobe presumed acute hemorrhage within a previously seen mass, stable vasogenic edema, no  midline shift. ER staff loaded pt with Keppra 1 gram and reverse with Platelets 1 donor pack for ASA use and started Decadron    # Left frontal mass with hmg; likely met lesion    As per pts grandson - declared HCP, no agressive measures, DNR/I status and no aggressive measures. Pt was sent to [facility] a year ago with needs for constant care, but remained stable.    # Encepahlopathy; multifactorial    - Continue Keppra 750mg BID  - Decadron 4q6 for associated lesion edema x 24 hours, as per NS  - EEG    WILL F/U PERIODICALLY AS NEEDED 89 yo female DNR/DNI with PMH of AFib (not on A/C) on ASA, Severe Dementia, CAD HTN CHF w/ SOB admissions in the past.  Pt has history of breast CA s/p mastectomy 30 years, also renal mass hx and brain tumor - suspected metastatic lesion vs meningioma brain along with Clival / Sella based lesion; s/p cyber knife surgery, presently resides at [facility]; presented to ED with change in MS per nursing staff at the [facility]. Pt has severe dementia and is non verbal at baseline per NH records, is unable to provide history. In ED, CT head revealed new hyperdensity within the medial left frontal lobe presumed acute hemorrhage within a previously seen mass, stable vasogenic edema, no  midline shift. ER staff loaded pt with Keppra 1 gram and reverse with Platelets 1 donor pack for ASA use and started Decadron    # Left frontal mass with hmg; likely met lesion    As per pts grandson - declared HCP, no agressive measures, DNR/I status and no aggressive measures. Pt was sent to [facility] a year ago with needs for constant care, but remained stable.    # Encepahlopathy; multifactorial    - Continue Keppra 750mg BID  - Decadron 4q6 for associated lesion edema x 24 hours, as per NS  - EEG in AM    Will f/u periodically, as needed

## 2018-11-11 NOTE — CONSULT NOTE ADULT - SUBJECTIVE AND OBJECTIVE BOX
CC: 89 y old  Female who presents with a chief complaint of lethargy (11 Nov 2018 15:01)    HPI:  89 yo female DNR/DNI with PMH of AFib (not on A/C) on ASA, Severe Dementia, CAD HTN CHF w/ SOB admissions in the past.  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 Pt has severe dementia and is non verbal at baseline per NH records, is unable to provide history. In ED, CT head revealed New hyperdensity within the medial left frontal lobe presumed acute hemorrhage within a previously seen mass. Stable vasogenic edema. No new hemorrhage or midline shift. ER staff loaded her with Keppra 1 gram and reverse with Platelets 1 donor pack for ASA use.    Pt has been observed overnite in [place], noted to be more lethargic today, CT head repeated this AM, no change in size of hemorrhage     PAST MEDICAL & SURGICAL HISTORY:  Afib  Dementia  CHF (congestive heart failure)  H/O total knee replacement: 15 YEARS AGO  H/O mastectomy, left: 30 YEARS AGO    FAMILY HISTORY:  UNABLE TO OBTAIN    Social Hx:  lives at [facility], no tobacco, EtOH    MEDICATIONS  (STANDING):  ALBUTerol/ipratropium for Nebulization 3 milliLiter(s) Nebulizer every 12 hours  cefTRIAXone Injectable. 1000 milliGRAM(s) IV Push every 24 hours  dexamethasone  Injectable 4 milliGRAM(s) IV Push every 6 hours  docusate sodium 100 milliGRAM(s) Oral daily  DULoxetine 30 milliGRAM(s) Oral daily  folic acid 1 milliGRAM(s) Oral daily  furosemide    Tablet 40 milliGRAM(s) Oral daily  levETIRAcetam 750 milliGRAM(s) Oral two times a day  metoprolol tartrate Injectable 2.5 milliGRAM(s) IV Push every 6 hours  simvastatin 10 milliGRAM(s) Oral at bedtime       Allergies    No Known Allergies    Intolerances    ROS: Pertinent positives in HPI, all other ROS unable to obtain  Vital Signs Last 24 Hrs  T(C): 36.7 (11 Nov 2018 06:18), Max: 37.4 (10 Nov 2018 18:11)  T(F): 98 (11 Nov 2018 06:18), Max: 99.3 (10 Nov 2018 18:11)  HR: 71 (11 Nov 2018 12:01) (71 - 89)  BP: 159/55 (11 Nov 2018 12:01) (124/69 - 192/47)  BP(mean): 75 (11 Nov 2018 12:01) (66 - 98)  RR: 14 (11 Nov 2018 12:01) (13 - 28)  SpO2: 100% (11 Nov 2018 12:01) (94% - 100%)    Physical Exam:  Constitutional: Lethargic.  HEENT: PERRLA, EOMI  Neck: Supple  Respiratory: Breath sounds are clear bilaterally  Cardiovascular: S1 and S2, regular rhythm  Extremities:  no edema  Vascular: No carotid Bruit  Musculoskeletal: no joint swelling/tenderness, no abnormal movements    Neurological Exam:  HF: lethargic not following commands, no verbal output mal affect, speech fluent, no aphasia or paraphasic errors. Naming /repetition intact   CN: PERRL, EOMI, unable to test VF no NLFD, tongue - unable   Motor: moving all ext antigravity, withdrawal to pain, some spontaneous movements, but not to command    Sens: grimace/withdrawal to painful stimuli   Reflexes: Symmetric and normal, downgoing toes b/l  Coord:  unable to assess   Gait : not tested    Labs:   11-11    143  |  105  |  14  ----------------------------<  145<H>  3.7   |  31  |  0.73    Ca    9.7      11 Nov 2018 05:03    TPro  7.2  /  Alb  3.0<L>  /  TBili  0.9  /  DBili  x   /  AST  15  /  ALT  17  /  AlkPhos  120  11-10                     12.6   11.86 )-----------( 341      ( 11 Nov 2018 05:03 )             39.7     Radiology:  - CT Head:< from: CT Head No Cont (11.11.18 @ 09:14) >  New hyperdensity within the medial left frontal lobe presumed acute   hemorrhage within a previously seen mass. Stable vasogenic edema. No new   hemorrhage or midline shift. Chronic findings as above.     MRI Brain May 2017 -  1. A solid and cystic lesion is present in the anterior inferior left   frontal region as described. Leading considerations include a meningioma   versus a metastasis (given the patient's clinical history). Other mass   lesions can't be excluded.  2. An invasive heterogeneously enhancing lesion involves the sella, right   cavernous sinus, and skull base. Some considerations include an invasive   pituitary macroadenoma, metastasis, or invasive meningioma. A defect   involves the nasal septum raising possibility of prior transsphenoidal   surgery. CC: 89 y old  Female who presents with a chief complaint of lethargy (11 Nov 2018 15:01)    HPI:  89 yo female DNR/DNI with PMH of AFib (not on A/C) on ASA, Severe Dementia, CAD HTN CHF w/ SOB admissions in the past.  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 Pt has severe dementia and is non verbal at baseline per NH records, is unable to provide history. In ED, CT head revealed New hyperdensity within the medial left frontal lobe presumed acute hemorrhage within a previously seen mass. Stable vasogenic edema. No new hemorrhage or midline shift. ER staff loaded her with Keppra 1 gram and reverse with Platelets 1 donor pack for ASA use.    Pt has been observed overnite in SD, noted to be more lethargic today, CT head repeated this AM, no change in size of hemorrhage.    At the time of my exam, pt barely arousable, grimaces and withdraws, no verbal output    PAST MEDICAL & SURGICAL HISTORY:  Afib  Dementia  CHF (congestive heart failure)  H/O total knee replacement: 15 YEARS AGO  H/O mastectomy, left: 30 YEARS AGO    FAMILY HISTORY:  UNABLE TO OBTAIN    Social Hx:  lives at [facility], no tobacco, EtOH    MEDICATIONS  (STANDING):  ALBUTerol/ipratropium for Nebulization 3 milliLiter(s) Nebulizer every 12 hours  cefTRIAXone Injectable. 1000 milliGRAM(s) IV Push every 24 hours  dexamethasone  Injectable 4 milliGRAM(s) IV Push every 6 hours  docusate sodium 100 milliGRAM(s) Oral daily  DULoxetine 30 milliGRAM(s) Oral daily  folic acid 1 milliGRAM(s) Oral daily  furosemide    Tablet 40 milliGRAM(s) Oral daily  levETIRAcetam 750 milliGRAM(s) Oral two times a day  metoprolol tartrate Injectable 2.5 milliGRAM(s) IV Push every 6 hours  simvastatin 10 milliGRAM(s) Oral at bedtime       Allergies    No Known Allergies    Intolerances    ROS: Pertinent positives in HPI, all other ROS unable to obtain  Vital Signs Last 24 Hrs  T(C): 36.7 (11 Nov 2018 06:18), Max: 37.4 (10 Nov 2018 18:11)  T(F): 98 (11 Nov 2018 06:18), Max: 99.3 (10 Nov 2018 18:11)  HR: 71 (11 Nov 2018 12:01) (71 - 89)  BP: 159/55 (11 Nov 2018 12:01) (124/69 - 192/47)  BP(mean): 75 (11 Nov 2018 12:01) (66 - 98)  RR: 14 (11 Nov 2018 12:01) (13 - 28)  SpO2: 100% (11 Nov 2018 12:01) (94% - 100%)    Physical Exam:  Constitutional: Lethargic.  HEENT: PERRLA, EOMI  Neck: Supple  Respiratory: Breath sounds are clear bilaterally  Cardiovascular: S1 and S2, regular rhythm  Extremities:  no edema  Vascular: No carotid Bruit  Musculoskeletal: no joint swelling/tenderness, no abnormal movements    Neurological Exam:  HF: lethargic, not following commands, no verbal output    CN: PERRL, EOMI, unable to test VF no gross NLFD, tongue - unable to check  Motor: withdrawal all 4 ext to pain, some spontaneous movements, but not to command    Sens: grimace/withdrawal to painful stimuli   Reflexes: Symmetric and normal, downgoing toes b/l  Coord:  unable to assess   Gait : not tested    Labs:   11-11    143  |  105  |  14  ----------------------------<  145<H>  3.7   |  31  |  0.73    Ca    9.7      11 Nov 2018 05:03    TPro  7.2  /  Alb  3.0<L>  /  TBili  0.9  /  DBili  x   /  AST  15  /  ALT  17  /  AlkPhos  120  11-10                     12.6   11.86 )-----------( 341      ( 11 Nov 2018 05:03 )             39.7     Radiology:  - CT Head:< from: CT Head No Cont (11.11.18 @ 09:14) >  New hyperdensity within the medial left frontal lobe presumed acute   hemorrhage within a previously seen mass. Stable vasogenic edema. No new   hemorrhage or midline shift. Chronic findings as above.     MRI Brain May 2017 -  1. A solid and cystic lesion is present in the anterior inferior left   frontal region as described. Leading considerations include a meningioma   versus a metastasis (given the patient's clinical history). Other mass   lesions can't be excluded.  2. An invasive heterogeneously enhancing lesion involves the sella, right   cavernous sinus, and skull base. Some considerations include an invasive   pituitary macroadenoma, metastasis, or invasive meningioma. A defect   involves the nasal septum raising possibility of prior transsphenoidal   surgery.

## 2018-11-11 NOTE — PROGRESS NOTE ADULT - SUBJECTIVE AND OBJECTIVE BOX
HPI:  89 yo female DNR/DNI h/o AFib not on A/C, + ASA, Severe Dementia, CAD HTN CHF w/ SOB admissions in the past.  Pt with past brain tumor s/p cyber knife suspected metastatic lesion vs meningioma of the brain, along with Clival / Sella based lesion. Pt w/ h/o breast CA s/p mastectomy 30 years, renal mass hx and presently residing at [facility].  Pt p/w change in MS per nursing staff at the [facility]. Pt has severe dementia and is non verbal which is her baseline as per NH records, and therefore cannot provide full history. Grandson at bedside - declared HCP.  States no agressive measures, DNR/I status and prefers to have patient taken back to [facility] if possible.  HCP was informed of planned observation re: hemorrhage in tumor bed for 12-24 hours and repeat imaging in am hours.  Pt 1 year ago sent to [facility] with needs for constant care, but remained stable re: CHF/SOB status along with metastatic? vs Brain tumor progression.  ER staff loaded with Keppra 1 gram and will reverse with Platelets 1 donor pack for ASA use.  Family expressed no aggressive measures.    11/11- Pt continues to be lethargic, CT head repeated this AM, no change in size of hemorrhage    Vital Signs Last 24 Hrs  T(C): 36.7 (11 Nov 2018 06:18), Max: 37.4 (10 Nov 2018 18:11)  T(F): 98 (11 Nov 2018 06:18), Max: 99.3 (10 Nov 2018 18:11)  HR: 84 (11 Nov 2018 08:57) (78 - 89)  BP: 124/69 (11 Nov 2018 08:00) (124/69 - 181/71)  BP(mean): 82 (11 Nov 2018 08:00) (66 - 98)  RR: 17 (11 Nov 2018 08:00) (17 - 28)  SpO2: 100% (11 Nov 2018 08:00) (94% - 100%)    MEDICATIONS  (STANDING):  ALBUTerol/ipratropium for Nebulization 3 milliLiter(s) Nebulizer every 12 hours  cefTRIAXone Injectable. 1000 milliGRAM(s) IV Push every 24 hours  dexamethasone  Injectable 4 milliGRAM(s) IV Push every 6 hours  docusate sodium 100 milliGRAM(s) Oral daily  DULoxetine 30 milliGRAM(s) Oral daily  folic acid 1 milliGRAM(s) Oral daily  furosemide    Tablet 40 milliGRAM(s) Oral daily  levETIRAcetam 750 milliGRAM(s) Oral two times a day  metoprolol tartrate Injectable 2.5 milliGRAM(s) IV Push every 6 hours  simvastatin 10 milliGRAM(s) Oral at bedtime    MEDICATIONS  (PRN):  acetaminophen   Tablet .. 650 milliGRAM(s) Oral every 6 hours PRN Temp greater or equal to 38C (100.4F), Mild Pain (1 - 3)  hydrALAZINE Injectable 10 milliGRAM(s) IV Push every 4 hours PRN for SBP>160  ondansetron Injectable 4 milliGRAM(s) IV Push every 6 hours PRN Nausea      PHYSICAL EXAM:  Constitutional: lethargic not following commands, no verbal output    HEENT: PERRLA, EOMI  Neck: Supple  Respiratory: Breath sounds are clear bilaterally  Cardiovascular: S1 and S2, regular  rhythm  Gastrointestinal: soft, nontender  Extremities:  no edema  Musculoskeletal: no joint swelling/tenderness, no abnormal movements  Skin: No rashes    Neurological exam:  HF: A x O x 3. Appropriately interactive, normal affect. Speech fluent, No Aphasia or paraphasic errors. Naming /repetition intact   CN: HAYDE, EOMI, VFF, facial sensation normal, no NLFD, tongue midline, Palate moves equally, SCM equal bilaterally  Motor: No pronator drift, Strength 5/5 in all 4 ext, normal bulk and tone, no tremor, rigidity or bradykinesia.    Sens: grimace/withdrawal to painful stimuli   Reflexes: Symmetric and normal . BJ 2+, BR 2+, KJ 2+, AJ 2+, downgoing toes b/l  Coord:  unable to assess   Gait/Balance: unable to assess    NIHSS:          LABS:                         12.6   11.86 )-----------( 341      ( 11 Nov 2018 05:03 )             39.7     11-11    143  |  105  |  14  ----------------------------<  145<H>  3.7   |  31  |  0.73    Ca    9.7      11 Nov 2018 05:03    TPro  7.2  /  Alb  3.0<L>  /  TBili  0.9  /  DBili  x   /  AST  15  /  ALT  17  /  AlkPhos  120  11-10    LIVER FUNCTIONS - ( 10 Nov 2018 18:19 )  Alb: 3.0 g/dL / Pro: 7.2 gm/dL / ALK PHOS: 120 U/L / ALT: 17 U/L / AST: 15 U/L / GGT: x                 RADIOLOGY: HPI:  89 yo female DNR/DNI h/o AFib not on A/C, + ASA, Severe Dementia, CAD HTN CHF w/ SOB admissions in the past.  Pt with past brain tumor s/p cyber knife suspected metastatic lesion vs meningioma of the brain, along with Clival / Sella based lesion. Pt w/ h/o breast CA s/p mastectomy 30 years, renal mass hx and presently residing at [facility].  Pt p/w change in MS per nursing staff at the [facility]. Pt has severe dementia and is non verbal which is her baseline as per NH records, and therefore cannot provide full history. Grandson at bedside - declared HCP.  States no agressive measures, DNR/I status and prefers to have patient taken back to [facility] if possible.  HCP was informed of planned observation re: hemorrhage in tumor bed for 12-24 hours and repeat imaging in am hours.  Pt 1 year ago sent to [facility] with needs for constant care, but remained stable re: CHF/SOB status along with metastatic? vs Brain tumor progression.  ER staff loaded with Keppra 1 gram and will reverse with Platelets 1 donor pack for ASA use.  Family expressed no aggressive measures.    11/11- Pt has increased lethargy from initial exam, CT head repeated this AM, no change in size of hemorrhage    Vital Signs Last 24 Hrs  T(C): 36.7 (11 Nov 2018 06:18), Max: 37.4 (10 Nov 2018 18:11)  T(F): 98 (11 Nov 2018 06:18), Max: 99.3 (10 Nov 2018 18:11)  HR: 84 (11 Nov 2018 08:57) (78 - 89)  BP: 124/69 (11 Nov 2018 08:00) (124/69 - 181/71)  BP(mean): 82 (11 Nov 2018 08:00) (66 - 98)  RR: 17 (11 Nov 2018 08:00) (17 - 28)  SpO2: 100% (11 Nov 2018 08:00) (94% - 100%)    MEDICATIONS  (STANDING):  ALBUTerol/ipratropium for Nebulization 3 milliLiter(s) Nebulizer every 12 hours  cefTRIAXone Injectable. 1000 milliGRAM(s) IV Push every 24 hours  dexamethasone  Injectable 4 milliGRAM(s) IV Push every 6 hours  docusate sodium 100 milliGRAM(s) Oral daily  DULoxetine 30 milliGRAM(s) Oral daily  folic acid 1 milliGRAM(s) Oral daily  furosemide    Tablet 40 milliGRAM(s) Oral daily  levETIRAcetam 750 milliGRAM(s) Oral two times a day  metoprolol tartrate Injectable 2.5 milliGRAM(s) IV Push every 6 hours  simvastatin 10 milliGRAM(s) Oral at bedtime    MEDICATIONS  (PRN):  acetaminophen   Tablet .. 650 milliGRAM(s) Oral every 6 hours PRN Temp greater or equal to 38C (100.4F), Mild Pain (1 - 3)  hydrALAZINE Injectable 10 milliGRAM(s) IV Push every 4 hours PRN for SBP>160  ondansetron Injectable 4 milliGRAM(s) IV Push every 6 hours PRN Nausea      PHYSICAL EXAM:  Constitutional: lethargic not following commands, no verbal output    HEENT: PERRLA, EOMI  Neck: Supple  Respiratory: Breath sounds are clear bilaterally  Cardiovascular: S1 and S2, regular  rhythm  Gastrointestinal: soft, nontender  Extremities:  no edema  Musculoskeletal: no joint swelling/tenderness, no abnormal movements  Skin: No rashes    Neurological exam:  HF: arousable to noxious stimuli, minimal verbal output, +confusion   CN: BRIAN, EOMI, unable to test visual fields  Motor: Moving all ext antigravity, withdrawal to pain, some spontaneous movements, but not to command    Sens: grimace/withdrawal to painful stimuli   Reflexes: Symmetric and normal, downgoing toes b/l  Coord:  unable to assess   Gait/Balance: unable to assess    LABS:                         12.6   11.86 )-----------( 341      ( 11 Nov 2018 05:03 )             39.7     11-11    143  |  105  |  14  ---------------------<  145<H>  3.7   |  31  |  0.73    Ca    9.7      11 Nov 2018 05:03    TPro  7.2  /  Alb  3.0<L>  /  TBili  0.9  /  DBili  x   /  AST  15  /  ALT  17  /  AlkPhos  120  11-10    LIVER FUNCTIONS - ( 10 Nov 2018 18:19 )  Alb: 3.0 g/dL / Pro: 7.2 gm/dL / ALK PHOS: 120 U/L / ALT: 17 U/L / AST: 15 U/L / GGT: x           RADIOLOGY:  < from: CT Head No Cont (11.11.18 @ 09:14) >  Stable hyperdensity within a previously seen medial left frontal lobe   mass presumably due to acute hemorrhage within the mass since no   hyperdensity was present on the CT head from 5/19/2018. No new   hemorrhages identified. There is stable extensive vasogenic edema in the   left frontal white matter. Additional moderate areas of low-attenuation   in bihemispheric white matter appear stable and are nonspecific but   likely scalloped chronic microvascular change. There is no evidence of   acuteterritorial infarct.    The ventricles, sulci and cisternal spaces are stable in size and   configuration demonstrating cerebral volume loss.  There is no midline   shift or abnormal extra-axial fluid collection.    There is no displaced calvarial fracture. Visualized paranasal sinuses   and mastoid air cells are clear. Stable enlargement of the sella with a   right-sided mass and underlying bony remodeling and cystic changes in the   clivus.    IMPRESSION:  New hyperdensity within the medial left frontal lobe presumed acute   hemorrhage within a previously seen mass. Stable vasogenic edema. No new   hemorrhage or midline shift. Chronic findings as above.

## 2018-11-12 PROCEDURE — 95819 EEG AWAKE AND ASLEEP: CPT | Mod: 26

## 2018-11-12 RX ADMIN — Medication 3 MILLILITER(S): at 07:37

## 2018-11-12 RX ADMIN — CEFTRIAXONE 1000 MILLIGRAM(S): 500 INJECTION, POWDER, FOR SOLUTION INTRAMUSCULAR; INTRAVENOUS at 21:56

## 2018-11-12 RX ADMIN — Medication 2.5 MILLIGRAM(S): at 11:48

## 2018-11-12 RX ADMIN — Medication 2.5 MILLIGRAM(S): at 06:09

## 2018-11-12 RX ADMIN — Medication 10 MILLIGRAM(S): at 21:56

## 2018-11-12 RX ADMIN — Medication 4 MILLIGRAM(S): at 01:31

## 2018-11-12 RX ADMIN — Medication 4 MILLIGRAM(S): at 06:06

## 2018-11-12 RX ADMIN — Medication 2.5 MILLIGRAM(S): at 17:27

## 2018-11-12 RX ADMIN — Medication 3 MILLILITER(S): at 20:03

## 2018-11-12 RX ADMIN — Medication 4 MILLIGRAM(S): at 17:27

## 2018-11-12 RX ADMIN — Medication 4 MILLIGRAM(S): at 11:49

## 2018-11-12 RX ADMIN — Medication 2.5 MILLIGRAM(S): at 01:32

## 2018-11-12 NOTE — SWALLOW BEDSIDE ASSESSMENT ADULT - SWALLOW EVAL: RECOMMENDED FEEDING/EATING TECHNIQUES
check mouth frequently for oral residue/pocketing/crush medication (when feasible)/maintain upright posture during/after eating for 30 mins/small sips/bites

## 2018-11-12 NOTE — SWALLOW BEDSIDE ASSESSMENT ADULT - COMMENTS
The patient was admitted to  with lethargy and altered mentation. Imaging in hospital is notable for finding of a cerebral hypodensity in left frontal lobe with hemorrhage/vasogenic edema. Neuro suspects that brain tumor is a metastatic lesion. Family opting for conservative measures. She is followed by Palliative Care. This profile is superimposed upon a history of advanced dementia, CAD, CHF, A-Fib, HTN, presence of a renal mass NOS, brain tumor NOS in Clival [place] region status post Cyber Knife, past breast cancer status post left mastectomy and previous TKR.

## 2018-11-12 NOTE — CONSULT NOTE ADULT - SUBJECTIVE AND OBJECTIVE BOX
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 Pt has severe dementia and is non verbal at baseline per NH records, is unable to provide history. In ED, CT head revealed New hyperdensity within the medial left frontal lobe presumed acute hemorrhage within a previously seen mass. Stable vasogenic edema. No new hemorrhage or midline shift.  Palliative medicine to establish GOC. Pt seen by our team on a prior adm as well.  11/12/18 Seen and examined at bedside with no family present. Pt awake and states she is in the hospital when questioned. Unable to give any further verbal response. Able to follow simple commands. Appears comfortable. Denies pain or dyspnea       PAIN: ( )Yes   (X )No  DYSPNEA: ( ) Yes  (X ) No  Level:    PAST MEDICAL & SURGICAL HISTORY:  Afib  Dementia  CHF (congestive heart failure)  H/O total knee replacement: 15 YEARS AGO  H/O mastectomy, left: 30 YEARS AGO      SOCIAL HX:  [facility]  Hx opiate tolerance ( )YES  (X )NO    Baseline ADLs  (Prior to Admission)  ( ) Independent   (X )Dependent    FAMILY HISTORY:  Unable to provide due to mental status    Review of Systems:      All other systems reviewed and negative  Unable to obtain/Limited due to: dementai/stroke      PHYSICAL EXAM:    Vital Signs Last 24 Hrs  T(C): 37 (12 Nov 2018 08:52), Max: 37 (12 Nov 2018 08:52)  T(F): 98.6 (12 Nov 2018 08:52), Max: 98.6 (12 Nov 2018 08:52)  HR: 77 (12 Nov 2018 07:47) (71 - 109)  BP: 151/75 (12 Nov 2018 06:00) (122/98 - 188/79)  BP(mean): 93 (12 Nov 2018 06:00) (69 - 103)  RR: 30 (12 Nov 2018 06:00) (14 - 33)  SpO2: 95% (12 Nov 2018 06:00) (93% - 100%)  Daily     Daily     PPSV2: 40 %  FAST: 7D    General: Elderly female in bed in NAD  Mental Status: Minimally verbal. States she is in the hospital  HEENT: oral mucosa dry  Lungs: clear to auscultation fannie  Cardiac: S1S2+  GI: abd soft NT ND + BS  : Voids  Ext: no edema  Neuro: HARGROVE=strength /dementia      LABS:                        12.6   11.86 )-----------( 341      ( 11 Nov 2018 05:03 )             39.7     11-11    143  |  105  |  14  ----------------------------<  145<H>  3.7   |  31  |  0.73    Ca    9.7      11 Nov 2018 05:03    TPro  7.2  /  Alb  3.0<L>  /  TBili  0.9  /  DBili  x   /  AST  15  /  ALT  17  /  AlkPhos  120  11-10    PT/INR - ( 10 Nov 2018 20:26 )   PT: 12.8 sec;   INR: 1.15 ratio         PTT - ( 10 Nov 2018 20:26 )  PTT:25.5 sec  Albumin: Albumin, Serum: 3.0 g/dL (11-10 @ 18:19)      Allergies    No Known Allergies    Intolerances      MEDICATIONS  (STANDING):  ALBUTerol/ipratropium for Nebulization 3 milliLiter(s) Nebulizer every 12 hours  cefTRIAXone Injectable. 1000 milliGRAM(s) IV Push every 24 hours  dexamethasone  Injectable 4 milliGRAM(s) IV Push every 6 hours  docusate sodium 100 milliGRAM(s) Oral daily  DULoxetine 30 milliGRAM(s) Oral daily  folic acid 1 milliGRAM(s) Oral daily  furosemide    Tablet 40 milliGRAM(s) Oral daily  metoprolol tartrate Injectable 2.5 milliGRAM(s) IV Push every 6 hours  simvastatin 10 milliGRAM(s) Oral at bedtime    MEDICATIONS  (PRN):  acetaminophen   Tablet .. 650 milliGRAM(s) Oral every 6 hours PRN Temp greater or equal to 38C (100.4F), Mild Pain (1 - 3)  hydrALAZINE Injectable 10 milliGRAM(s) IV Push every 4 hours PRN for SBP>160  ondansetron Injectable 4 milliGRAM(s) IV Push every 6 hours PRN Nausea      RADIOLOGY/ADDITIONAL STUDIES:  < from: CT Head No Cont (11.11.18 @ 09:14) >  EXAM:  CT BRAIN                            PROCEDURE DATE:  11/11/2018          INTERPRETATION:  CLINICAL HISTORY: Unresponsive. Follow-up head bleed    TECHNIQUE:  CT of the head without contrast.  Contiguous transaxial images of the head were acquired from the skull   base to the vertex without the administration of iodinated contrast.   Coronal and sagittal reformatted images are provided.     COMPARISON:  CT head from 11/10/2018 and 5/20 6/20/2015. Brain MRI from   5/21/2017    FINDINGS:    Stable hyperdensity within a previously seen medial left frontal lobe   mass presumably due to acute hemorrhage within the mass since no   hyperdensity was present on the CT head from 5/19/2018. No new   hemorrhages identified. There is stable extensive vasogenic edema in the   left frontal white matter. Additional moderate areas of low-attenuation   in bihemispheric white matter appear stable and are nonspecific but   likely scalloped chronic microvascular change. There is no evidence of   acuteterritorial infarct.    The ventricles, sulci and cisternal spaces are stable in size and   configuration demonstrating cerebral volume loss.  There is no midline   shift or abnormal extra-axial fluid collection.    There is no displaced calvarial fracture. Visualized paranasal sinuses   and mastoid air cells are clear. Stable enlargement of the sella with a   right-sided mass and underlying bony remodeling and cystic changes in the   clivus.    IMPRESSION:  New hyperdensity within the medial left frontal lobe presumed acute   hemorrhage within a previously seen mass. Stable vasogenic edema. No new   hemorrhage or midline shift. Chronic findings as above.    < from: Xray Chest 1 View-PORTABLE IMMEDIATE (11.10.18 @ 18:35) >    EXAM:  XR CHEST PORTABLE IMMED 1V                            PROCEDURE DATE:  11/10/2018          INTERPRETATION:  XR CHEST PORTABLE IMMED 1V    Single AP view    HISTORY:  period of unresponsiveness    Comparison:  Chest x-ray 6/1/2017    Left chest wall surgical clips. Cardiomegaly. Mild pulmonary edema and   trace bilateral pleural effusions.    IMPRESSION: Pulmonary edema and trace bilateral pleural effusions.

## 2018-11-12 NOTE — SWALLOW BEDSIDE ASSESSMENT ADULT - ASR SWALLOW LABIAL MOBILITY
Limited probes revealed that her effort was reduced when executing volitional labial actions but no overt lip focality was evident during clinical probes.

## 2018-11-12 NOTE — SWALLOW BEDSIDE ASSESSMENT ADULT - PHARYNGEAL PHASE
Swallow trigger was timely to mildly latent and laryngeal lift on palpation during swallowing trials was mildly to moderately reduced but felt to be grossly functional with some of the above modified food textures. Post prandial coughing was demonstrated with thin liquids despite cues to employ compensatory swallowing maneuvers. No overt aspiration signs were demonstrated with nectar thick liquids, pureed foods and foods which require mastication. Odynophagia was denied.

## 2018-11-12 NOTE — CHART NOTE - NSCHARTNOTEFT_GEN_A_CORE
This SW spoke with pts HCP Aleks Joel [phone] who reports he cannot be available at the meeting tomorrow at 8AM. He states that he defers decision making to pts other HCP Paola, who will be at the meeting. Our team will continue to follow.

## 2018-11-12 NOTE — SWALLOW BEDSIDE ASSESSMENT ADULT - ASR SWALLOW LINGUAL MOBILITY
Limited probes revealed that her effort was reduced when executing volitional lingual actions but no overt tongue focality was evident during clinical probes

## 2018-11-12 NOTE — SWALLOW BEDSIDE ASSESSMENT ADULT - ASR SWALLOW RECOMMEND DIAG
DEFER MBS AS PT APPEARED CLINICALLY TOLERANT OF SUGGESTED PO TEXTURES FROM AN OROPHARYNGEAL SWALLOWING STANCE, DYSPHAGIA WITH A PROPENSITY TO FLUCTUATE IN SEVERITY GIVEN PROFILE AND CONSIDERING HER ALTERED MENTATION.

## 2018-11-12 NOTE — CHART NOTE - NSCHARTNOTEFT_GEN_A_CORE
This SW spoke with pts Grandson Hammad ([phone]) he is a primary contact however, his Uncle Aleks (lives out of state) and Mother Paola are HCPs. He reports that him and his mother are available for a family meeting in person tomorrow and his Uncle can be available by phone at 8AM. Our team will continue to follow.

## 2018-11-12 NOTE — SWALLOW BEDSIDE ASSESSMENT ADULT - ORAL PHASE
Bolus formation/transfer with solids were moderately to excessively prolonged. Bolus formation/transfer with mech soft foods/pureed foods/liquids were mildly to moderately prolonged but felt to be mechanically functional with these PO types. Piecemeal deglutition was evident. Scattered tongue debris noted with coarse solids.

## 2018-11-12 NOTE — SWALLOW BEDSIDE ASSESSMENT ADULT - SLP GENERAL OBSERVATIONS
The patient was alert. She was interactive but communicatively passive and internally distractible at times. She was able to verbalize during communicative probes. When verbalizing, her motor speech abilities were within functional parameters and speech intelligibility was good. However, his verbalizations were typically brief and variably reflective of reduced orientation/memory/insight c/w  Cognitive-Linguistic Dysfunction.  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 The patient was alert. She was interactive but communicatively passive and internally distractible at times. She was able to verbalize during communicative probes. When verbalizing, her motor speech abilities were within functional parameters and speech intelligibility was good. However, her verbalizations were typically brief and variably reflective of reduced orientation/memory/insight c/w  Cognitive-Linguistic Dysfunction.  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

## 2018-11-12 NOTE — CONSULT NOTE ADULT - ASSESSMENT
Pt is a 89y old Female with a PMH of AFib (not on A/C) on ASA, Severe Dementia, CAD, HTN, CHF, history of breast CA s/p mastectomy 30 years, also renal mass hx and brain tumor - suspected metastatic lesion vs meningioma brain lesion; s/p cyber knife surgery, presently resides at [facility]; presented to ED with change in MS per nursing staff at the [facility]. Pt has severe dementia and is non verbal at baseline per NH records, is unable to provide history. In ED, CT head revealed New hyperdensity within the medial left frontal lobe presumed acute hemorrhage within a previously seen mass. Stable vasogenic edema. No new hemorrhage or midline shift.  Palliative medicine to establish GOC. Pt seen by our team on a prior adm as well.  11/12/18 Seen and examined at bedside with no family present. Pt awake and states she is in the hospital when questioned. Unable to give any further verbal response. Able to follow simple commands. Appears comfortable. Denies pain or dyspnea       Assessment and Plan:    1) AMS  -R/T encephalopathy  -More alert today  -CT head=presumed acute hemorrhage within mass  -seizure precautions  -c/w IV keppra, decadron  -hold all anticoagulation and antiplatelets   -Neurosurgical eval appreciated: no aggressive interventions per family  -repeat CT head stable    2) Chronic diastolic CHF  -echo (5/22/17) EF 60-65% mild MR, mod to severe TR, severe pHTN  -cont diuresis  -CXR noted    3) Debility  -R/T dementia  -R/T suspected metastatic cancer  -heart failure  -PT eval    4) Cancer  - breast CA s/p mastectomy 30 years  - renal mass hx   - brain tumor - suspected metastatic lesion vs meningioma brain lesion  - s/p cyber knife surgery    5) Advanced Directives  -Pt without capacity  -Aleks Joel named HCP  -Armando Cueva involved in pt's care  -DNR/DNI on chart  -Will schedule GOC meeting

## 2018-11-12 NOTE — SWALLOW BEDSIDE ASSESSMENT ADULT - SWALLOW EVAL: RECOMMENDED DIET
SUGGEST  A DYSPHAGIA 2 DIET WITH NECTAR THICK LIQUIDS AS THIS IS CURRENTLY THE LEAST RESTRICTIVE TOLERABLE DIET CONSISTENCIES FROM AN OROPHARYNGEAL SWALLOWING PERSPECTIVE ON EXAM.

## 2018-11-12 NOTE — SWALLOW BEDSIDE ASSESSMENT ADULT - SWALLOW EVAL: SECRETION MANAGEMENT
No drooling noted but it should be indicated that the strength of her volitional cough was mildly reduced.

## 2018-11-12 NOTE — PROGRESS NOTE ADULT - SUBJECTIVE AND OBJECTIVE BOX
PI:  89 yo female  h/o AFib not on A/C, + ASA, Severe Dementia, CAD,  HTN, CHF HFpEF ,  brain tumor s/p cyber knife -suspected metastatic lesion, renal mass, h/o  breast CA s/p mastectomy 30 years ago,  presently residing at [facility],  p/w change in MS per nursing staff at the [facility]. Pt has severe dementia and is non verbal which is her baseline as per NH records  -found to have new intraparenchymal hemorrhage in the tumor bed  -given IV steroids and IV keppra    : Pt seen and examined. Awake today. Able to respond with yes and no. Does not have any complaints. Does not say much more. Pt NPO as failed dysphagia screening.       REVIEW OF SYSTEMS:    unable to obtain due to dementia and lethargy    All other review of systems is negative unless indicated above    Vital Signs Last 24 Hrs  T(C): 37 (2018 14:25), Max: 37 (2018 08:52)  T(F): 98.6 (2018 14:25), Max: 98.6 (2018 08:52)  HR: 83 (2018 14:00) (77 - 109)  BP: 156/72 (2018 14:00) (122/98 - 173/62)  BP(mean): 92 (2018 14:00) (69 - 103)  RR: 27 (2018 14:00) (20 - 33)  SpO2: 98% (2018 14:00) (93% - 100%)    PHYSICAL EXAM:    GENERAL: NAD,  HEENT:  NC/AT, EOMI, PERRLA, No scleral icterus, Moist mucous membranes  NECK: Supple, No JVD  CNS:  Alert & Oriented X0, non focal  LUNG: Normal Breath sounds, Clear to auscultation bilaterally, No rales, No rhonchi, No wheezing  HEART: RRR; No murmurs, No rubs  ABDOMEN: +BS, ST/ND/NT  GENITOURINARY: Voiding, Bladder not distended  EXTREMITIES:  2+ Peripheral Pulses, No clubbing, No cyanosis, No tibial edema  MUSCULOSKELTAL: Joints normal ROM, No TTP, No effusion  SKIN: no rashes     Labs    CARDIAC MARKERS ( 10 Nov 2018 18:19 )  0.019 ng/mL / x     / x     / x     / x                                12.6   11.86 )-----------( 341      ( 2018 05:03 )             39.7     2018 05:03    143    |  105    |  14     ----------------------------<  145    3.7     |  31     |  0.73     Ca    9.7        2018 05:03    TPro  7.2    /  Alb  3.0    /  TBili  0.9    /  DBili  x      /  AST  15     /  ALT  17     /  AlkPhos  120    10 Nov 2018 18:19    PT/INR - ( 10 Nov 2018 20:26 )   PT: 12.8 sec;   INR: 1.15 ratio         PTT - ( 10 Nov 2018 20:26 )  PTT:25.5 sec  CAPILLARY BLOOD GLUCOSE        LIVER FUNCTIONS - ( 10 Nov 2018 18:19 )  Alb: 3.0 g/dL / Pro: 7.2 gm/dL / ALK PHOS: 120 U/L / ALT: 17 U/L / AST: 15 U/L / GGT: x           Urinalysis Basic - ( 10 Nov 2018 18:10 )    Color: Yellow / Appearance: very cloudy / S.015 / pH: x  Gluc: x / Ketone: Negative  / Bili: Negative / Urobili: Negative mg/dL   Blood: x / Protein: 30 mg/dL / Nitrite: Negative   Leuk Esterase: Moderate / RBC: 6-10 /HPF / WBC >50   Sq Epi: x / Non Sq Epi: Few / Bacteria: Many      MEDICATIONS  (STANDING):  ALBUTerol/ipratropium for Nebulization 3 milliLiter(s) Nebulizer every 12 hours  cefTRIAXone Injectable. 1000 milliGRAM(s) IV Push every 24 hours  dexamethasone  Injectable 4 milliGRAM(s) IV Push every 6 hours  furosemide    Tablet 40 milliGRAM(s) Oral daily  metoprolol tartrate Injectable 2.5 milliGRAM(s) IV Push every 6 hours    MEDICATIONS  (PRN):  acetaminophen   Tablet .. 650 milliGRAM(s) Oral every 6 hours PRN Temp greater or equal to 38C (100.4F), Mild Pain (1 - 3)  hydrALAZINE Injectable 10 milliGRAM(s) IV Push every 4 hours PRN for SBP>160  ondansetron Injectable 4 milliGRAM(s) IV Push every 6 hours PRN Nausea        all labs reviewed  all imaging reviewed      1. Encephalopathy due to intraparenchymal hemorrhage:  cw IV decadron  hold all anticoagulation and antiplatelets   Neurosurgical eval appreciated: no aggressive interventions per family  repeat CT head stable  Palliative care consult appreciated - f/u family meeting in AM    2. Pyuria r/o  UTI  -cont ceftriaxone day 3 tonight  -f/u cultures - aerococcus sp    3. Chronic diastolic CHF/Severe pulm hypertension  -I/O, daily weight  -echo (17) EF 60-65% mild MR, mod to severe TR, severe pHTN    4. AFib  -EKG: AFib HR 72, L posterior fascicular block, Q in V1-2, III, aVF  -cont BB  -no ASA due to current brain bleed    #advanced care planning  -advanced directives signed in ED with pt's stefania Cueva Prior [phone]  -pt is DNR/DNI

## 2018-11-12 NOTE — SWALLOW BEDSIDE ASSESSMENT ADULT - SWALLOW EVAL: DIAGNOSIS
1) The patient demonstrates periodically reduced orientation to feeding atop Oropharyngeal Dysphagia which subjectively appears to be a functional condition with a restricted inventory of modified food textures when she is alert/cognizant enough to be fed. Overt aspiration signs demonstrated with thin liquids only. Dysphagia is in setting of brain tumor in left frontal lobe with hemorrhage as well as underlying dementia.   2) The patient was alert. She was interactive but communicatively passive and internally distractible at times. She was able to verbalize during communicative probes. When verbalizing, her motor speech abilities were within functional parameters and speech intelligibility was good. However, his verbalizations were typically brief and variably reflective of reduced orientation/memory/insight c/w  Cognitive-Linguistic Dysfunction.  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 1) The patient demonstrates periodically reduced orientation to feeding atop Oropharyngeal Dysphagia which subjectively appears to be a functional condition with a restricted inventory of modified food textures when she is alert/cognizant enough to be fed. Overt aspiration signs demonstrated with thin liquids only. Dysphagia is in setting of brain tumor in left frontal lobe with hemorrhage as well as underlying dementia.   2) The patient was alert. She was interactive but communicatively passive and internally distractible at times. She was able to verbalize during communicative probes. When verbalizing, her motor speech abilities were within functional parameters and speech intelligibility was good. However, her verbalizations were typically brief and variably reflective of reduced orientation/memory/insight c/w  Cognitive-Linguistic Dysfunction.  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

## 2018-11-12 NOTE — SWALLOW BEDSIDE ASSESSMENT ADULT - ORAL PREPARATORY PHASE
Pt was distractible but willingly accepted PO. Labial grading on utensils was functional. oral containment was also functional. Pt was distractible but willingly accepted PO. Labial grading on utensils was functional. Oral containment was also functional.

## 2018-11-13 RX ORDER — ASCORBIC ACID 60 MG
1 TABLET,CHEWABLE ORAL
Qty: 0 | Refills: 0 | COMMUNITY

## 2018-11-13 RX ORDER — IPRATROPIUM/ALBUTEROL SULFATE 18-103MCG
3 AEROSOL WITH ADAPTER (GRAM) INHALATION
Qty: 0 | Refills: 0 | COMMUNITY

## 2018-11-13 RX ORDER — FOLIC ACID 0.8 MG
1 TABLET ORAL
Qty: 0 | Refills: 0 | COMMUNITY

## 2018-11-13 RX ORDER — DOCUSATE SODIUM 100 MG
1 CAPSULE ORAL
Qty: 0 | Refills: 0 | COMMUNITY

## 2018-11-13 RX ORDER — ATENOLOL 25 MG/1
25 TABLET ORAL DAILY
Qty: 0 | Refills: 0 | Status: DISCONTINUED | OUTPATIENT
Start: 2018-11-14 | End: 2018-11-16

## 2018-11-13 RX ORDER — DEXAMETHASONE 0.5 MG/5ML
4 ELIXIR ORAL EVERY 8 HOURS
Qty: 0 | Refills: 0 | Status: DISCONTINUED | OUTPATIENT
Start: 2018-11-13 | End: 2018-11-15

## 2018-11-13 RX ORDER — FUROSEMIDE 40 MG
1 TABLET ORAL
Qty: 0 | Refills: 0 | COMMUNITY

## 2018-11-13 RX ORDER — PREGABALIN 225 MG/1
1 CAPSULE ORAL
Qty: 0 | Refills: 0 | COMMUNITY

## 2018-11-13 RX ORDER — SIMVASTATIN 20 MG/1
1 TABLET, FILM COATED ORAL
Qty: 0 | Refills: 0 | COMMUNITY

## 2018-11-13 RX ADMIN — Medication 10 MILLIGRAM(S): at 18:22

## 2018-11-13 RX ADMIN — Medication 40 MILLIGRAM(S): at 14:44

## 2018-11-13 RX ADMIN — Medication 4 MILLIGRAM(S): at 22:54

## 2018-11-13 RX ADMIN — Medication 3 MILLILITER(S): at 10:39

## 2018-11-13 RX ADMIN — Medication 2.5 MILLIGRAM(S): at 00:57

## 2018-11-13 RX ADMIN — Medication 4 MILLIGRAM(S): at 13:04

## 2018-11-13 RX ADMIN — Medication 10 MILLIGRAM(S): at 06:54

## 2018-11-13 RX ADMIN — Medication 2.5 MILLIGRAM(S): at 06:13

## 2018-11-13 RX ADMIN — Medication 4 MILLIGRAM(S): at 00:58

## 2018-11-13 RX ADMIN — Medication 4 MILLIGRAM(S): at 06:14

## 2018-11-13 RX ADMIN — Medication 2.5 MILLIGRAM(S): at 13:04

## 2018-11-13 NOTE — PHYSICAL THERAPY INITIAL EVALUATION ADULT - PERTINENT HX OF CURRENT PROBLEM, REHAB EVAL
p/w change in MS per nursing staff at the [facility]. Pt has severe dementia and is non verbal at her baseline. found to have new intraparenchymal hemorrhage in the tumor bed. no aggressive interventions per family. CTH: New hyperdensity within the medial left frontal lobe presumed acute hemorrhage within a previously seen mass. Stable vasogenic edema.

## 2018-11-13 NOTE — CHART NOTE - NSCHARTNOTEFT_GEN_A_CORE
HPI:  89 y.o. 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   PMH: as above  PSH: as above and TKR  Social Hx: lives at [facility], no tobacco, EtOH  Family Hx: unable to obtain due to dementia, current condition  ROS: unable to obtain due to dementia, current condition (10 Nov 2018 21:51)      PERTINENT PMH REVIEWED:  [ X ] YES [ ] NO           Primary Contact:  Pt. has HCP naming Aleks Joel [phone] who this SW spoke to and he defers decision making to alternate HCP Paola.     HCP [ X  ] Surrogate [   ] Guardian [   ]    Mental Status: [ ] Alert  [  ] Oriented [  ] Confused [ X ] Lethargic [  ]  Concerns of Depression [  ]- history of   Anxiety [   ]- none identified   Baseline ADLs (prior to admission):  Independent [ ] moderately [ ] fully   Dependent   [ X ] moderately [ ]fully    Family Meeting attendees: Pts HCP Paola (daughter in law), Grandroge Cueva, Daina Roland NP, Doug Cervantes Palliative LMSW    Anticipated Grief: Patient [  ] Family [ X ]    Goals of Care: Comfort [ X ] Rehabilitation [  ] Curative [  ] Life Prolonging [  ]    Previous Services: [facility]    ADVANCE DIRECTIVES:  [ X ] YES [ ] NO   DNR [ X ] YES [ ] NO  DNI [ X ] YES [ ] NO     Anticipated D/C Plan: If [facility] agrees back there with [facility] and likely 24 hour private aides                    Summary:    Team met with pts family to discuss goals of care, assist with planning and provide supportive counseling. Pt. was residing at [facility] on the reflections unit with a private aide 9 hours a day. Pts family reports pt. has been wheelchair bound but able to feed herself. They discussed how about a year ago pt. was in inpatient hospice at [facility] and improved, which from there she went back to [facility] with 24 hour aides and [facility]. Pts family reports she improved further and was taken off hospice and her aide hours were actually decreased as well.     Pts current medical condition and goals of care discussed. Pts family is clear they want to focus on pts comfort and have her return back to [facility]. We discussed possible option of pt. returning with [facility] again, which they are in agreement with. Pts family is also willing to hire 24 hour aides. We discussed how speech and swallow recommended a new diet for pt. and this would have to be confirmed with [facility] as well as if they are willing to take back with hospice. Pts family expressed understanding. We explained if they were not able to then the next option would be LTC in a SNF as pt. is not currently eligible for inpatient hospice.     Team also confirmed with pts family her wishes for DNR/DNI. Form is on chart.     Plan at this time is pending on [facility] and if they will accept pt. back with [facility] support. Danielle Caicedo CM made aware of plan and covering SW will speak with Edel. Emotional support provided. Our team will continue to follow.

## 2018-11-13 NOTE — PROGRESS NOTE ADULT - SUBJECTIVE AND OBJECTIVE BOX
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 Pt has severe dementia and is non verbal which is her baseline as per NH records  -found to have new intraparenchymal hemorrhage in the tumor bed  -given IV steroids and IV keppra    : Pt seen and examined. Awake today. Able to respond with yes and no. Does not have any complaints. Does not say much more. Pt NPO as failed dysphagia screening.   : awake alert, severe hearing deficit, no c/o  Vital Signs Last 24 Hrs  T(C): 36.4 (2018 14:18), Max: 36.9 (2018 21:21)  T(F): 97.6 (2018 14:18), Max: 98.4 (2018 21:21)  HR: 89 (2018 16:00) (82 - 102)  BP: 153/70 (2018 16:00) (130/97 - 177/56)  BP(mean): 90 (2018 16:00) (78 - 106)  RR: 28 (2018 16:00) (19 - 33)  SpO2: 97% (2018 16:00) (92% - 100%)  PHYSICAL EXAM:    GENERAL: NAD,  HEENT:  NC/AT, EOMI, PERRLA, No scleral icterus, Moist mucous membranes  NECK: Supple, No JVD  CNS:  Alert & Oriented X0, non focal  LUNG: Normal Breath sounds, Clear to auscultation bilaterally, No rales, No rhonchi, No wheezing  HEART: RRR; No murmurs, No rubs  ABDOMEN: +BS, ST/ND/NT  GENITOURINARY: Voiding, Bladder not distended  EXTREMITIES:  2+ Peripheral Pulses, No clubbing, No cyanosis, No tibial edema  MUSCULOSKELTAL: Joints normal ROM, No TTP, No effusion  SKIN: no rashes     Labs    CARDIAC MARKERS ( 10 Nov 2018 18:19 )  0.019 ng/mL / x     / x     / x     / x                                12.6   11.86 )-----------( 341      ( 2018 05:03 )             39.7     2018 05:03    143    |  105    |  14     ----------------------------<  145    3.7     |  31     |  0.73     Ca    9.7        2018 05:03    TPro  7.2    /  Alb  3.0    /  TBili  0.9    /  DBili  x      /  AST  15     /  ALT  17     /  AlkPhos  120    10 Nov 2018 18:19    PT/INR - ( 10 Nov 2018 20:26 )   PT: 12.8 sec;   INR: 1.15 ratio         PTT - ( 10 Nov 2018 20:26 )  PTT:25.5 sec  CAPILLARY BLOOD GLUCOSE        LIVER FUNCTIONS - ( 10 Nov 2018 18:19 )  Alb: 3.0 g/dL / Pro: 7.2 gm/dL / ALK PHOS: 120 U/L / ALT: 17 U/L / AST: 15 U/L / GGT: x           Urinalysis Basic - ( 10 Nov 2018 18:10 )    Color: Yellow / Appearance: very cloudy / S.015 / pH: x  Gluc: x / Ketone: Negative  / Bili: Negative / Urobili: Negative mg/dL   Blood: x / Protein: 30 mg/dL / Nitrite: Negative   Leuk Esterase: Moderate / RBC: 6-10 /HPF / WBC >50   Sq Epi: x / Non Sq Epi: Few / Bacteria: Many      MEDICATIONS  (STANDING):  ALBUTerol/ipratropium for Nebulization 3 milliLiter(s) Nebulizer every 12 hours  cefTRIAXone Injectable. 1000 milliGRAM(s) IV Push every 24 hours  dexamethasone  Injectable 4 milliGRAM(s) IV Push every 6 hours  furosemide    Tablet 40 milliGRAM(s) Oral daily  metoprolol tartrate Injectable 2.5 milliGRAM(s) IV Push every 6 hours    MEDICATIONS  (PRN):  acetaminophen   Tablet .. 650 milliGRAM(s) Oral every 6 hours PRN Temp greater or equal to 38C (100.4F), Mild Pain (1 - 3)  hydrALAZINE Injectable 10 milliGRAM(s) IV Push every 4 hours PRN for SBP>160  ondansetron Injectable 4 milliGRAM(s) IV Push every 6 hours PRN Nausea        all labs reviewed  all imaging reviewed      1. Encephalopathy due to intraparenchymal hemorrhage:  - change to po decadron    2. No uti dc abx    3. Chronic diastolic CHF/Severe pulm hypertension  -echo (17) EF 60-65% mild MR, mod to severe TR, severe pHTN    4. AFib  -EKG: AFib HR 72, L posterior fascicular block, Q in V1-2, III, aVF  -cont BB  -no ASA due to current brain bleed    #advanced care planning  -advanced directives signed in ED with pt's stefania Cueva Prior [phone]  -pt is DNR/DNI    Change to reg diet as the dysphagia diet not possible at rehab  dc planning

## 2018-11-13 NOTE — PROGRESS NOTE ADULT - SUBJECTIVE AND OBJECTIVE BOX
HPI: Pt is a 89y old Female with a PMH of AFib (not on A/C) on ASA, Severe Dementia, CAD, HTN, CHF, history of breast CA s/p mastectomy 30 years, also renal mass hx and brain tumor - suspected metastatic lesion vs meningioma brain lesion; s/p cyber knife surgery, presently resides at [facility]; presented to ED with change in MS per nursing staff at the [facility]. Pt has severe dementia and is non verbal at baseline per NH records, is unable to provide history. In ED, CT head revealed New hyperdensity within the medial left frontal lobe presumed acute hemorrhage within a previously seen mass. Stable vasogenic edema. No new hemorrhage or midline shift.  Palliative medicine to establish GOC. Pt seen by our team on a prior adm as well.  11/12/18 Seen and examined at bedside with no family present. Pt awake and states she is in the hospital when questioned. Unable to give any further verbal response. Able to follow simple commands. Appears comfortable. Denies pain or dyspnea   11/13/18 Seen and examined at bedside with no family present. OOB/chair. Appears comfortable. Denies pain or dyspnea    PAIN: ( )Yes   (X )No  DYSPNEA: ( ) Yes  (X ) No  Level:    PAST MEDICAL & SURGICAL HISTORY:  Afib  Dementia  CHF (congestive heart failure)  H/O total knee replacement: 15 YEARS AGO  H/O mastectomy, left: 30 YEARS AGO      SOCIAL HX:  Lives in [facility]  Hx opiate tolerance ( )YES  (X )NO    Baseline ADLs  (Prior to Admission)  ( ) Independent   (X )Dependent    FAMILY HISTORY:  Unable to provide due to mental status    Review of Systems:      All other systems reviewed and negative  Unable to obtain/Limited due to: dementia /stroke      PHYSICAL EXAM:  ICU Vital Signs Last 24 Hrs  T(C): 36.4 (13 Nov 2018 14:18), Max: 36.9 (12 Nov 2018 21:21)  T(F): 97.6 (13 Nov 2018 14:18), Max: 98.4 (12 Nov 2018 21:21)  HR: 92 (13 Nov 2018 13:31) (82 - 102)  BP: 141/57 (13 Nov 2018 13:31) (105/77 - 177/56)  BP(mean): 78 (13 Nov 2018 13:31) (78 - 106)  ABP: --  ABP(mean): --  RR: 23 (13 Nov 2018 13:31) (19 - 33)  SpO2: 96% (13 Nov 2018 13:31) (92% - 100%)      PPSV2: 40 %  FAST: 7D    General: Elderly female in bed in NAD  Mental Status: Minimally verbal. States she is in the hospital  HEENT: oral mucosa dry  Lungs: clear to auscultation fannie  Cardiac: S1S2+  GI: abd soft NT ND + BS  : Voids  Ext: no edema  Neuro: HARGROVE=strength /dementia      LABS:                        12.6   11.86 )-----------( 341      ( 11 Nov 2018 05:03 )             39.7     11-11    143  |  105  |  14  ----------------------------<  145<H>  3.7   |  31  |  0.73    Ca    9.7      11 Nov 2018 05:03    TPro  7.2  /  Alb  3.0<L>  /  TBili  0.9  /  DBili  x   /  AST  15  /  ALT  17  /  AlkPhos  120  11-10    PT/INR - ( 10 Nov 2018 20:26 )   PT: 12.8 sec;   INR: 1.15 ratio         PTT - ( 10 Nov 2018 20:26 )  PTT:25.5 sec  Albumin: Albumin, Serum: 3.0 g/dL (11-10 @ 18:19)      Allergies    No Known Allergies    Intolerances

## 2018-11-13 NOTE — PHYSICAL THERAPY INITIAL EVALUATION ADULT - GAIT DEVIATIONS NOTED, PT EVAL
4/2/2017          Giovanni Reyes Campos  [address] [place] [address]    Dear Óscar:    [facility] wants to ensure your discharge home is safe and you or your loved ones have had all your questions answered regarding your care after you leave the hospital.    You may receive a telephone call within two days of your discharge.  This call is to make certain you understand your discharge instructions as well as ensure we provided you with the best care possible during your stay with us.     The call will only last approximately 3-5 minutes and will be done by a nurse.    Once again, we want to ensure your discharge home is safe and that you have a clear understanding of any next steps in your care.  If you have any questions or concerns, please do not hesitate to contact us, we are here for you.  Thank you for choosing [facility] for your healthcare needs.    Sincerely,    Surya Pena    [facility]         shuffling, PT negotiating RW

## 2018-11-13 NOTE — GOALS OF CARE CONVERSATION - PERSONAL ADVANCE DIRECTIVE - CONVERSATION DETAILS
The role of Palliative medicine was discussed with the pt's grandson and daughter in law who met with our team on a previous adm. They would like to focus on comfort care and do not want to pursue any aggressive medical interventions. They understand that she has cancer and currently hemorrhage into the brain lesion. She is currently at baseline and they are hopeful she can return to [facility] with [facility]. We discussed her dysphagia and they understand the risk of aspiration and would like her to cont to eat as prior to adm. 60 minutes spent discussing advanced care planning.

## 2018-11-14 RX ADMIN — Medication 3 MILLILITER(S): at 08:27

## 2018-11-14 RX ADMIN — Medication 4 MILLIGRAM(S): at 21:45

## 2018-11-14 RX ADMIN — Medication 10 MILLIGRAM(S): at 06:47

## 2018-11-14 RX ADMIN — ATENOLOL 25 MILLIGRAM(S): 25 TABLET ORAL at 05:21

## 2018-11-14 RX ADMIN — Medication 40 MILLIGRAM(S): at 05:21

## 2018-11-14 RX ADMIN — Medication 10 MILLIGRAM(S): at 18:32

## 2018-11-14 RX ADMIN — Medication 4 MILLIGRAM(S): at 05:21

## 2018-11-14 RX ADMIN — Medication 4 MILLIGRAM(S): at 13:44

## 2018-11-14 RX ADMIN — Medication 3 MILLILITER(S): at 20:02

## 2018-11-14 NOTE — PROGRESS NOTE ADULT - SUBJECTIVE AND OBJECTIVE BOX
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 Pt has severe dementia and is non verbal which is her baseline as per NH records  -found to have new intraparenchymal hemorrhage in the tumor bed  -given IV steroids and IV keppra    11/12: Pt seen and examined. Awake today. Able to respond with yes and no. Does not have any complaints. Does not say much more. Pt NPO as failed dysphagia screening.   11/13: awake alert, severe hearing deficit, no c/o  11/14- unable to tolerate regular diet, mentation waxing and waning    GENERAL: NAD,  HEENT:  NC/AT, EOMI, PERRLA, No scleral icterus, Moist mucous membranes  NECK: Supple, No JVD  CNS:  awake, oriented x0, occasionally follows commands  LUNG: Normal Breath sounds, Clear to auscultation bilaterally, No rales, No rhonchi, No wheezing  HEART: RRR; No murmurs, No rubs  ABDOMEN: +BS, ST/ND/NT  GENITOURINARY: Voiding, Bladder not distended  EXTREMITIES:  2+ Peripheral Pulses, No clubbing, No cyanosis, No tibial edema  MUSCULOSKELTAL: Joints normal ROM, No TTP, No effusion  SKIN: no rashes      PHYSICAL EXAM:    Daily     Daily     ICU Vital Signs Last 24 Hrs  T(C): 36.3 (14 Nov 2018 10:47), Max: 37.2 (14 Nov 2018 05:08)  T(F): 97.4 (14 Nov 2018 10:47), Max: 99 (14 Nov 2018 05:08)  HR: 89 (14 Nov 2018 10:47) (84 - 94)  BP: 157/72 (14 Nov 2018 10:47) (136/41 - 187/98)  BP(mean): 64 (13 Nov 2018 20:00) (64 - 114)  ABP: --  ABP(mean): --  RR: 18 (14 Nov 2018 10:47) (18 - 28)  SpO2: 95% (14 Nov 2018 10:47) (94% - 98%)        MEDICATIONS  (STANDING):  ALBUTerol/ipratropium for Nebulization 3 milliLiter(s) Nebulizer every 12 hours  ATENolol  Tablet 25 milliGRAM(s) Oral daily  dexamethasone     Tablet 4 milliGRAM(s) Oral every 8 hours  furosemide    Tablet 40 milliGRAM(s) Oral daily

## 2018-11-15 RX ORDER — ASPIRIN/CALCIUM CARB/MAGNESIUM 324 MG
1 TABLET ORAL
Qty: 0 | Refills: 0 | COMMUNITY

## 2018-11-15 RX ORDER — DEXAMETHASONE 0.5 MG/5ML
1 ELIXIR ORAL
Qty: 18 | Refills: 0 | OUTPATIENT
Start: 2018-11-15 | End: 2018-11-17

## 2018-11-15 RX ORDER — ACETAMINOPHEN 500 MG
2 TABLET ORAL
Qty: 40 | Refills: 0 | OUTPATIENT
Start: 2018-11-15 | End: 2018-11-19

## 2018-11-15 RX ORDER — DEXAMETHASONE 0.5 MG/5ML
2 ELIXIR ORAL EVERY 8 HOURS
Qty: 0 | Refills: 0 | Status: DISCONTINUED | OUTPATIENT
Start: 2018-11-15 | End: 2018-11-16

## 2018-11-15 RX ADMIN — ATENOLOL 25 MILLIGRAM(S): 25 TABLET ORAL at 05:55

## 2018-11-15 RX ADMIN — Medication 3 MILLILITER(S): at 20:52

## 2018-11-15 RX ADMIN — Medication 4 MILLIGRAM(S): at 05:55

## 2018-11-15 RX ADMIN — Medication 2 MILLIGRAM(S): at 20:59

## 2018-11-15 RX ADMIN — Medication 2 MILLIGRAM(S): at 13:37

## 2018-11-15 RX ADMIN — Medication 3 MILLILITER(S): at 10:32

## 2018-11-15 RX ADMIN — Medication 40 MILLIGRAM(S): at 05:55

## 2018-11-15 NOTE — DISCHARGE NOTE ADULT - HOSPITAL COURSE
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 Pt has severe dementia and is non verbal which is her baseline as per NH records  -found to have new intraparenchymal hemorrhage in the tumor bed  -given IV steroids and IV keppra    11/12: Pt seen and examined. Awake today. Able to respond with yes and no. Does not have any complaints. Does not say much more. Pt NPO as failed dysphagia screening.   11/13: awake alert, severe hearing deficit, no c/o  11/14- unable to tolerate regular diet, mentation waxing and waning  11/15- mentation waxing and waning, able to tolerate diet in am, now drowsy,  arousable with repititive stimulation, very rarely follows  commands     GENERAL: NAD,  HEENT:  NC/AT, EOMI, PERRLA, No scleral icterus, Moist mucous membranes  NECK: Supple, No JVD  CNS: oriented x0, occasionally follows commands  LUNG: Normal Breath sounds, Clear to auscultation bilaterally, No rales, No rhonchi, No wheezing  HEART: RRR; No murmurs, No rubs  ABDOMEN: +BS, ST/ND/NT  GENITOURINARY: Voiding, Bladder not distended  EXTREMITIES:  2+ Peripheral Pulses, No clubbing, No cyanosis, No tibial edema  MUSCULOSKELTAL: Joints normal ROM, No TTP, No effusion  SKIN: no rashes  · Assessment		  1. Encephalopathy due to intraparenchymal hemorrhage:  - on  po decadron taper  -mentation not improving, waxing and waning   as per family no agressive neuro surgical measures  patient DNR/DNI   plan for comfort care  plan for D/C to assisted living with home hospice  patient failed dysphagia screen, recommendations by speech and swallow is dysphagia diet   but family/Grandson wants her to be on regualr diet and they understand the very high risk of aspiration/respiratory failure and high risk of death from it  grandson wansts her to be on regular diet and to be sent to [facility] assisted [facility]  does not want inpatient hospice     2. No uti dc abx    3. Chronic diastolic CHF/Severe pulm hypertension  -echo (5/22/17) EF 60-65% mild MR, mod to severe TR, severe pHTN  continue lasix 40 daily    4. 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 Pt has severe dementia and is non verbal which is her baseline as per NH records  -found to have new intraparenchymal hemorrhage in the tumor bed  -given IV steroids and IV keppra    11/12: Pt seen and examined. Awake today. Able to respond with yes and no. Does not have any complaints. Does not say much more. Pt NPO as failed dysphagia screening.   11/13: awake alert, severe hearing deficit, no c/o  11/14- unable to tolerate regular diet, mentation waxing and waning  11/15- mentation waxing and waning, able to tolerate diet in am, now drowsy,  arousable with repititive stimulation, very rarely follows  commands     GENERAL: NAD,  HEENT:  NC/AT, EOMI, PERRLA, No scleral icterus, Moist mucous membranes  NECK: Supple, No JVD  CNS: oriented x0, occasionally follows commands  LUNG: Normal Breath sounds, Clear to auscultation bilaterally, No rales, No rhonchi, No wheezing  HEART: RRR; No murmurs, No rubs  ABDOMEN: +BS, ST/ND/NT  GENITOURINARY: Voiding, Bladder not distended  EXTREMITIES:  2+ Peripheral Pulses, No clubbing, No cyanosis, No tibial edema  MUSCULOSKELTAL: Joints normal ROM, No TTP, No effusion  SKIN: no rashes  · Assessment		  1. Encephalopathy due to intraparenchymal hemorrhage:Brain tumor (metastatic lesion vs meningioma)  - on  po decadron taper  -mentation not improving, waxing and waning  very poor pronosis   as per family no agressive neuro surgical measures  patient DNR/DNI   plan for comfort care  plan for D/C to assisted living with home hospice  patient failed dysphagia screen, recommendations by speech and swallow is dysphagia diet   but family/Grandson wants her to be on regualr diet and they understand the very high risk of aspiration/respiratory failure and high risk of death from it  grandson wansts her to be on regular diet and to be sent to [facility] assisted living  does not want inpatient hospice     2. No uti dc abx    3. Chronic diastolic CHF/Severe pulm hypertension  -echo (5/22/17) EF 60-65% mild MR, mod to severe TR, severe pHTN  continue lasix 40 daily    4. AFib  -EKG: AFib HR 72, L posterior fascicular block, Q in V1-2, III, aVF  -cont BB  -no ASA due to current brain bleed    #advanced care planning  -advanced directives signed in ED with pt's stefania Cueva Prior [phone]  -pt is DNR/DNI    discharge time spent 45 mins  discussed with RN, casemanager ,palliative and stefania

## 2018-11-15 NOTE — PROGRESS NOTE ADULT - ASSESSMENT
1. Encephalopathy due to intraparenchymal hemorrhage:  - on  po decadron taper    2. No uti dc abx    3. Chronic diastolic CHF/Severe pulm hypertension  -echo (5/22/17) EF 60-65% mild MR, mod to severe TR, severe pHTN    4. AFib  -EKG: AFib HR 72, L posterior fascicular block, Q in V1-2, III, aVF  -cont BB  -no ASA due to current brain bleed    #advanced care planning  -advanced directives signed in ED with pt's stefania Cueva Prior [phone]  -pt is DNR/DNI    change back to dysphagia diet, unable to discharge to rehab , unable to tolerate regular diet, mentation waxing and waning  taper decadron  dc planning
Pt is a 89y old Female with a PMH of AFib (not on A/C) on ASA, Severe Dementia, CAD, HTN, CHF, history of breast CA s/p mastectomy 30 years, also renal mass hx and brain tumor - suspected metastatic lesion vs meningioma brain lesion; s/p cyber knife surgery, presently resides at [facility]; presented to ED with change in MS per nursing staff at the [facility]. Pt has severe dementia and is non verbal at baseline per NH records, is unable to provide history. In ED, CT head revealed New hyperdensity within the medial left frontal lobe presumed acute hemorrhage within a previously seen mass. Stable vasogenic edema. No new hemorrhage or midline shift.  Palliative medicine to establish GOC. Pt seen by our team on a prior adm as well.  11/12/18 Seen and examined at bedside with no family present. Pt awake and states she is in the hospital when questioned. Unable to give any further verbal response. Able to follow simple commands. Appears comfortable. Denies pain or dyspnea       Assessment and Plan:    1) AMS  -R/T encephalopathy  -More alert today  -CT head=presumed acute hemorrhage within mass  -seizure precautions  -c/w IV keppra, decadron  -hold all anticoagulation and antiplatelets   -Neurosurgical eval appreciated: no aggressive interventions per family  -repeat CT head stable    2) Chronic diastolic CHF  -echo (5/22/17) EF 60-65% mild MR, mod to severe TR, severe pHTN  -cont diuresis  -CXR noted    3) Debility  -R/T dementia  -R/T suspected metastatic cancer  -heart failure  -PT eval    4) Cancer  - breast CA s/p mastectomy 30 years  - renal mass hx   - brain tumor - suspected metastatic lesion vs meningioma brain lesion  - s/p cyber knife surgery    5) Advanced Directives  -Pt without capacity  -Aleks Joel named HCP  -Armando Cueva involved in pt's care  -DNR/DNI on chart  -GOC meeting today 11/13  -Plan is to transition back to [facility] with hospice support if able
Pt is a 89y old Female with a PMH of AFib (not on A/C) on ASA, Severe Dementia, CAD, HTN, CHF, history of breast CA s/p mastectomy 30 years, also renal mass hx and brain tumor - suspected metastatic lesion vs meningioma brain lesion; s/p cyber knife surgery, presently resides at [facility]; presented to ED with change in MS per nursing staff at the [facility]. Pt has severe dementia and is non verbal at baseline per NH records, is unable to provide history. In ED, CT head revealed New hyperdensity within the medial left frontal lobe presumed acute hemorrhage within a previously seen mass. Stable vasogenic edema. No new hemorrhage or midline shift.  Palliative medicine to establish GOC. Pt seen by our team on a prior adm as well.  11/12/18 Seen and examined at bedside with no family present. Pt awake and states she is in the hospital when questioned. Unable to give any further verbal response. Able to follow simple commands. Appears comfortable. Denies pain or dyspnea       Assessment and Plan:    1) AMS  -R/T encephalopathy  -More alert today  -CT head=presumed acute hemorrhage within mass  -seizure precautions  -c/w IV keppra, decadron  -hold all anticoagulation and antiplatelets   -Neurosurgical eval appreciated: no aggressive interventions per family  -repeat CT head stable    2) Chronic diastolic CHF  -echo (5/22/17) EF 60-65% mild MR, mod to severe TR, severe pHTN  -cont diuresis  -CXR noted    3) Debility  -R/T dementia  -R/T suspected metastatic cancer  -heart failure  -PT eval    4) Cancer  - breast CA s/p mastectomy 30 years  - renal mass hx   - brain tumor - suspected metastatic lesion vs meningioma brain lesion  - s/p cyber knife surgery    5) Advanced Directives  -Pt without capacity  -Aleks Joel named HCP  -Armando Cueva involved in pt's care  -DNR/DNI on chart  -GOC meeting today 11/13  -Plan is to transition back to [facility] with hospice support if able  -Will sign off as pt is comfortable and goals are clear
89 yo female DNR/DNI h/o AFib not on A/C, + ASA, Severe Dementia, CAD HTN CHF w/ SOB admissions in the past.  Pt with past brain tumor s/p cyber knife suspected metastatic lesion vs meningioma of the brain, along with sella/clival lesion. + h/o breast CA s/p mastectomy 30 years, renal mass hx p/w change in MS. New hemorrhage in tumor bed identified requiring observation for 12-24 hours and repeat imaging / seizure prophylaxis / precautions recommended.  Pt 1 year ago sent to [facility] with needs for constant care, but remained stable re: CHF/SOB status along with metastatic? vs Brain tumor progression.  ER staff loaded with Keppra 1 gram, Decadron dosing, and will reverse with Platelets 1 donor pack for ASA use.  Family expressed no aggressive measures.    Repeat CT head shows stable hemorrhage within tumor bed   Lethargy due to metabolic encephalopathy  No neurosurgical intervention  Pt is DNR- grandson is HCP, family expressed no aggressive measures.   Recommend medical management  re-consult as needed     Discussed with Dr. Cordoba who has review imaging and agrees with plan

## 2018-11-15 NOTE — DISCHARGE NOTE ADULT - PATIENT PORTAL LINK FT
You can access the [facility] Patient Portal, offered by [facility], by registering with the following website: [email or web address]

## 2018-11-15 NOTE — DISCHARGE NOTE ADULT - PLAN OF CARE
intracranial bleed, acute encephalopathy, no aggressive measures desired by family, discharge to assisted livingwith home hospice see discharge summary

## 2018-11-15 NOTE — DISCHARGE NOTE ADULT - MEDICATION SUMMARY - MEDICATIONS TO TAKE
I will START or STAY ON the medications listed below when I get home from the hospital:    dexamethasone 2 mg oral tablet  -- 1 tab(s) by mouth every 8 hours x3 days  1 tab BID x3 days,then  1 tab daily x3 days, then stop  -- Indication: For brain    acetaminophen 325 mg oral tablet  -- 2 tab(s) by mouth every 6 hours, As needed, Temp greater or equal to 38C (100.4F), Mild Pain (1 - 3)  -- Indication: For .    simvastatin 10 mg oral tablet  -- 1 tab(s) by mouth once a day (at bedtime)  -- Indication: For cholesterol    atenolol 25 mg oral tablet  -- 1 tab(s) by mouth once a day  -- Indication: For heart    DuoNeb 0.5 mg-2.5 mg/3 mL inhalation solution  -- 3 milliliter(s) inhaled 2 times a day  -- Indication: For lung    Lasix 40 mg oral tablet  -- 1 tab(s) by mouth once a day  -- Indication: For .heart    Colace 100 mg oral capsule  -- 1 cap(s) by mouth once a day (at bedtime)  -- Indication: For constipation    Vitamin C 500 mg oral capsule  -- 1 cap(s) by mouth once a day  -- Indication: For .    Vitamin B-12 1000 mcg oral tablet  -- 1 tab(s) by mouth once a day  -- Indication: For .    folic acid 1 mg oral tablet  -- 1 tab(s) by mouth once a day  -- Indication: For .

## 2018-11-15 NOTE — PROGRESS NOTE ADULT - SUBJECTIVE AND OBJECTIVE BOX
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 Pt has severe dementia and is non verbal at baseline per NH records, is unable to provide history. In ED, CT head revealed New hyperdensity within the medial left frontal lobe presumed acute hemorrhage within a previously seen mass. Stable vasogenic edema. No new hemorrhage or midline shift.  Palliative medicine to establish GOC. Pt seen by our team on a prior adm as well.  11/12/18 Seen and examined at bedside with no family present. Pt awake and states she is in the hospital when questioned. Unable to give any further verbal response. Able to follow simple commands. Appears comfortable. Denies pain or dyspnea   11/13/18 Seen and examined at bedside with no family present. OOB/chair. Appears comfortable. Denies pain or dyspnea  11/15/18 Seen and examined at bedside. OOB/chair. Eating small amt breakfast. Denies pain      PAIN: ( )Yes   (X )No  DYSPNEA: ( ) Yes  (X ) No  Level:    PAST MEDICAL & SURGICAL HISTORY:  Afib  Dementia  CHF (congestive heart failure)  H/O total knee replacement: 15 YEARS AGO  H/O mastectomy, left: 30 YEARS AGO      SOCIAL HX:  Lives in [place]  Hx opiate tolerance ( )YES  (X )NO    Baseline ADLs  (Prior to Admission)  ( ) Independent   (X )Dependent    FAMILY HISTORY:  Unable to provide due to mental status    Review of Systems:      All other systems reviewed and negative  Unable to obtain/Limited due to: dementia /stroke      PHYSICAL EXAM:  ICU Vital Signs Last 24 Hrs  T(C): 36.3 (15 Nov 2018 05:01), Max: 37.1 (14 Nov 2018 21:55)  T(F): 97.4 (15 Nov 2018 05:01), Max: 98.7 (14 Nov 2018 21:55)  HR: 82 (15 Nov 2018 05:01) (79 - 90)  BP: 170/84 (15 Nov 2018 05:01) (147/70 - 178/88)  BP(mean): --  ABP: --  ABP(mean): --  RR: 17 (15 Nov 2018 05:01) (17 - 18)  SpO2: 95% (15 Nov 2018 05:01) (93% - 96%)      PPSV2: 40 %  FAST: 7D    General: Elderly female in chair in NAD  Mental Status: Minimally verbal.   HEENT: oral mucosa dry  Lungs: clear to auscultation fannie  Cardiac: S1S2+  GI: abd soft NT ND + BS  : Voids  Ext: no edema  Neuro: HARGROVE=strength /dementia      LABS:                        12.6   11.86 )-----------( 341      ( 11 Nov 2018 05:03 )             39.7     11-11    143  |  105  |  14  ----------------------------<  145<H>  3.7   |  31  |  0.73    Ca    9.7      11 Nov 2018 05:03    TPro  7.2  /  Alb  3.0<L>  /  TBili  0.9  /  DBili  x   /  AST  15  /  ALT  17  /  AlkPhos  120  11-10    PT/INR - ( 10 Nov 2018 20:26 )   PT: 12.8 sec;   INR: 1.15 ratio         PTT - ( 10 Nov 2018 20:26 )  PTT:25.5 sec  Albumin: Albumin, Serum: 3.0 g/dL (11-10 @ 18:19)      Allergies    No Known Allergies    Intolerances

## 2018-11-15 NOTE — DIETITIAN INITIAL EVALUATION ADULT. - PERTINENT MEDS FT
MEDICATIONS  (STANDING):  ALBUTerol/ipratropium for Nebulization 3 milliLiter(s) Nebulizer every 12 hours  ATENolol  Tablet 25 milliGRAM(s) Oral daily  dexamethasone     Tablet 2 milliGRAM(s) Oral every 8 hours  furosemide    Tablet 40 milliGRAM(s) Oral daily    MEDICATIONS  (PRN):  acetaminophen   Tablet .. 650 milliGRAM(s) Oral every 6 hours PRN Temp greater or equal to 38C (100.4F), Mild Pain (1 - 3)  hydrALAZINE Injectable 10 milliGRAM(s) IV Push every 4 hours PRN for SBP>160  ondansetron Injectable 4 milliGRAM(s) IV Push every 6 hours PRN Nausea

## 2018-11-15 NOTE — DISCHARGE NOTE ADULT - MEDICATION SUMMARY - MEDICATIONS TO STOP TAKING
I will STOP taking the medications listed below when I get home from the hospital:    DULoxetine 30 mg oral delayed release capsule  -- 1 cap(s) by mouth once a day    aspirin 81 mg oral tablet  -- 1 tab(s) by mouth once a day

## 2018-11-15 NOTE — DISCHARGE NOTE ADULT - CARE PLAN
Principal Discharge DX:	Altered mental status, unspecified altered mental status type  Goal:	intracranial bleed, acute encephalopathy, no aggressive measures desired by family, discharge to assisted livingwith home hospice  Assessment and plan of treatment:	see discharge summary

## 2018-11-15 NOTE — DIETITIAN INITIAL EVALUATION ADULT. - OTHER INFO
89 yo F seen as LOS admitted presented to ER with lethargy and decreased HR. PMH AFib, CAD, CHF, dementia, ?renal lesion with mets to brain s/p cyberknife, depression, HTN, HLD, left breast cancer s/p mastectomy. On observation pt appears well nourished and NFPE shows no overt signs of muscle or fat wasting. As per RN pts overall intake fluctuates. Pt currently on mechanically soft diet with nectar thick liquids 2/2 hx of dysphagia. Pt likely eating less than normal 2/2 wt loss.  Pt with increased needs 2/2 PMH CHF and CA. Recommend providing ensure NT 1x/day and Gelatein BID to help meet increased needs. GOC d/w family. No agressive medical care and comfort care important to family currently. Pt with no noted c/o n/v/c/d. No noted skin breakdown. No edema or skin breakdown. Dawson Mcintyre. Recommendation. Provide ensure NT 1x/day 2. Provide Gelatein BID. 3. Provide assistance as needed to encourage PO intake. 4. Monitor wts weekly. 5. Provide MVI to meet 100% of needs.

## 2018-11-16 VITALS
RESPIRATION RATE: 18 BRPM | TEMPERATURE: 97 F | DIASTOLIC BLOOD PRESSURE: 67 MMHG | SYSTOLIC BLOOD PRESSURE: 143 MMHG | HEART RATE: 70 BPM | OXYGEN SATURATION: 95 %

## 2018-11-16 RX ADMIN — Medication 2 MILLIGRAM(S): at 05:38

## 2018-11-16 RX ADMIN — ATENOLOL 25 MILLIGRAM(S): 25 TABLET ORAL at 05:39

## 2018-11-16 RX ADMIN — Medication 3 MILLILITER(S): at 08:36

## 2018-11-16 RX ADMIN — Medication 3 MILLILITER(S): at 18:27

## 2018-11-16 RX ADMIN — Medication 2 MILLIGRAM(S): at 13:56

## 2018-11-16 RX ADMIN — Medication 40 MILLIGRAM(S): at 05:39

## 2018-11-16 NOTE — PROGRESS NOTE ADULT - SUBJECTIVE AND OBJECTIVE BOX
· Subjective and Objective: 	  PI:  89 yo female  h/o AFib not on A/C, + ASA, Severe Dementia, CAD,  HTN, CHF HFpEF ,  brain tumor s/p cyber knife -suspected metastatic lesion, renal mass, h/o  breast CA s/p mastectomy 30 years ago,  presently residing at [facility],  p/w change in MS per nursing staff at the [facility]. Pt has severe dementia and is non verbal which is her baseline as per NH records  -found to have new intraparenchymal hemorrhage in the tumor bed  -given IV steroids and IV keppra    11/12: Pt seen and examined. Awake today. Able to respond with yes and no. Does not have any complaints. Does not say much more. Pt NPO as failed dysphagia screening.   11/13: awake alert, severe hearing deficit, no c/o  11/14- unable to tolerate regular diet, mentation waxing and waning  11/15- mentation waxing and waning, able to tolerate diet in am, now drowsy,  arousable with repititive stimulation, very rarely follows  commands   11/16- today sitting in chair, awake, orinetd x1 to self, does not follow all commands     GENERAL: NAD,  HEENT:  NC/AT, EOMI, PERRLA, No scleral icterus, Moist mucous membranes  NECK: Supple, No JVD  CNS: oriented x1, occasionally follows commands  LUNG: Normal Breath sounds, Clear to auscultation bilaterally, No rales, No rhonchi, No wheezing  HEART: RRR; No murmurs, No rubs  ABDOMEN: +BS, ST/ND/NT  GENITOURINARY: Voiding, Bladder not distended  EXTREMITIES:  2+ Peripheral Pulses, No clubbing, No cyanosis, No tibial edema  MUSCULOSKELTAL: Joints normal ROM, No TTP, No effusion  SKIN: no rashes  · Assessment		  1. Encephalopathy due to intraparenchymal hemorrhage:Brain tumor (metastatic lesion vs meningioma)  - on  po decadron taper  -mentation not improving, waxing and waning  very poor pronosis   as per family no agressive neuro surgical measures  patient DNR/DNI   plan for comfort care  plan for D/C to assisted living with home hospice  patient failed dysphagia screen, recommendations by speech and swallow is dysphagia diet   but family/Grandson wants her to be on regualr diet and they understand the very high risk of aspiration/respiratory failure and high risk of death from it  stefania wansts her to be on regular diet and to be sent to [facility]  does not want inpatient hospice 2. No uti dc abx    3. Chronic diastolic CHF/Severe pulm hypertension  -echo (5/22/17) EF 60-65% mild MR, mod to severe TR, severe pHTN  continue lasix 40 daily    4. AFib  -EKG: AFib HR 72, L posterior fascicular block, Q in V1-2, III, aVF  -cont BB  -no ASA due to current brain bleed    #advanced care planning  -advanced directives signed in ED with pt's stefania Cueva Prior [phone]  -pt is DNR/DNI    discharge time spent 45 mins  discussed with RN, casemanager ,palliative and stefania

## 2018-11-27 DIAGNOSIS — I48.2 CHRONIC ATRIAL FIBRILLATION: ICD-10-CM

## 2018-11-27 DIAGNOSIS — Z79.899 OTHER LONG TERM (CURRENT) DRUG THERAPY: ICD-10-CM

## 2018-11-27 DIAGNOSIS — Z51.5 ENCOUNTER FOR PALLIATIVE CARE: ICD-10-CM

## 2018-11-27 DIAGNOSIS — I27.20 PULMONARY HYPERTENSION, UNSPECIFIED: ICD-10-CM

## 2018-11-27 DIAGNOSIS — Z79.82 LONG TERM (CURRENT) USE OF ASPIRIN: ICD-10-CM

## 2018-11-27 DIAGNOSIS — C79.31 SECONDARY MALIGNANT NEOPLASM OF BRAIN: ICD-10-CM

## 2018-11-27 DIAGNOSIS — F03.90 UNSPECIFIED DEMENTIA WITHOUT BEHAVIORAL DISTURBANCE: ICD-10-CM

## 2018-11-27 DIAGNOSIS — I25.10 ATHEROSCLEROTIC HEART DISEASE OF NATIVE CORONARY ARTERY WITHOUT ANGINA PECTORIS: ICD-10-CM

## 2018-11-27 DIAGNOSIS — I62.01 NONTRAUMATIC ACUTE SUBDURAL HEMORRHAGE: ICD-10-CM

## 2018-11-27 DIAGNOSIS — Z66 DO NOT RESUSCITATE: ICD-10-CM

## 2018-11-27 DIAGNOSIS — I50.32 CHRONIC DIASTOLIC (CONGESTIVE) HEART FAILURE: ICD-10-CM

## 2018-11-27 DIAGNOSIS — G93.40 ENCEPHALOPATHY, UNSPECIFIED: ICD-10-CM

## 2018-11-27 DIAGNOSIS — E78.5 HYPERLIPIDEMIA, UNSPECIFIED: ICD-10-CM

## 2018-11-27 DIAGNOSIS — F32.9 MAJOR DEPRESSIVE DISORDER, SINGLE EPISODE, UNSPECIFIED: ICD-10-CM

## 2018-11-27 DIAGNOSIS — G93.6 CEREBRAL EDEMA: ICD-10-CM

## 2018-11-27 DIAGNOSIS — R53.1 WEAKNESS: ICD-10-CM

## 2018-11-27 DIAGNOSIS — Z85.3 PERSONAL HISTORY OF MALIGNANT NEOPLASM OF BREAST: ICD-10-CM

## 2018-11-27 DIAGNOSIS — I11.0 HYPERTENSIVE HEART DISEASE WITH HEART FAILURE: ICD-10-CM

## 2019-05-21 NOTE — GOALS OF CARE CONVERSATION - PERSONAL ADVANCE DIRECTIVE - WHAT MATTERS MOST

## 2019-08-10 NOTE — DIETITIAN INITIAL EVALUATION ADULT. - OTHER INFO
UBW found in SNF records, patient has hx of AFib on eliquis, dementia, angina, HTN, anemia, Hosp course reveals brain metastasis with a small hemorrhage, renal mass, Cystitis/UTI, CHF as well as an elevated troponin. No

## 2021-12-28 NOTE — INPATIENT CERTIFICATION FOR MEDICARE PATIENTS - NS ICMP TWO DAYS INPATIENT
14.2   1.52  )-----------( 300      ( 27 Dec 2021 21:31 )             42.0       12-27    137  |  100  |  17  ----------------------------<  100<H>  3.5   |  13<L>  |  0.9    Ca    9.8      27 Dec 2021 21:31    TPro  6.8  /  Alb  4.5  /  TBili  0.4  /  DBili  x   /  AST  24  /  ALT  18  /  AlkPhos  82  12-27                      Lactate Trend  12-27 @ 21:31 Lactate:4.2             CAPILLARY BLOOD GLUCOSE            Culture Results:   GI PCR Results: NOT detected  *******Please Note:*******  GI panel PCR evaluates for:  Campylobacter, Plesiomonas shigelloides, Salmonella,  Vibrio, Yersinia enterocolitica, Enteroaggregative  Escherichia coli (EAEC), Enteropathogenic E.coli (EPEC),  Enterotoxigenic E. coli (ETEC) lt/st, Shiga-like  toxin-producing E. coli (STEC) stx1/stx2,  Shigella/ Enteroinvasive E. coli (EIEC), Cryptosporidium,  Cyclospora cayetanensis, Entamoeba histolytica,  Giardia lamblia, Adenovirus F 40/41, Astrovirus,  Norovirus GI/GII, Rotavirus A, Sapovirus (12-15 @ 17:54)  Culture Results:   No Growth Final (12-15 @ 11:00)  Culture Results:   No growth (12-15 @ 11:00)  Culture Results:   50,000 - 99,000 CFU/mL Enterococcus faecalis (12-14 @ 15:16)  Culture Results:   No enteric pathogens isolated.  (Stool culture examined for Salmonella,  Shigella, Campylobacter, Aeromonas, Plesiomonas,  Vibrio, E.coli O157 and Yersinia) (12-09 @ 17:20)  Culture Results:   No growth at 1 week. (12-09 @ 17:20)  Culture Results:   No Protozoa seen by trichrome stain  No Helminths or Protozoa seen in formalin concentrate  performed by iodine stain  (routine O+P not evaluated for Microsporidia,  Cryptosporidia, Cyclospora, or Isospora.)  One negative sample does not necessarily rule  out the presence of a parasitic infection. (12-09 @ 17:20)  Culture Results:   No growth (12-09 @ 12:00)  Culture Results:   No Growth Final (12-07 @ 21:08)
Yes

## 2022-07-21 NOTE — H&P ADULT - NSHPLABSRESULTS_GEN_ALL_CORE
CARDIAC MARKERS ( 2017 04:55 )  0.058 ng/mL / x     / 29 U/L / x     / x                                12.2   17.0  )-----------( 276      ( 2017 04:55 )             36.1     2017 04:55    135    |  101    |  13     ----------------------------<  113    4.2     |  30     |  0.81     Ca    9.5        2017 04:55    TPro  6.4    /  Alb  2.8    /  TBili  0.7    /  DBili  x      /  AST  17     /  ALT  21     /  AlkPhos  75     2017 04:55    PT/INR - ( 2017 04:55 )   PT: 11.6 sec;   INR: 1.07 ratio         PTT - ( 2017 04:55 )  PTT:30.8 sec  CAPILLARY BLOOD GLUCOSE    LIVER FUNCTIONS - ( 2017 04:55 )  Alb: 2.8 g/dL / Pro: 6.4 gm/dL / ALK PHOS: 75 U/L / ALT: 21 U/L / AST: 17 U/L / GGT: x           Urinalysis Basic - ( 2017 06:43 )    Color: Yellow / Appearance: Clear / S.010 / pH: x  Gluc: x / Ketone: Negative  / Bili: Negative / Urobili: Negative mg/dL   Blood: x / Protein: Negative mg/dL / Nitrite: Negative   Leuk Esterase: Trace / RBC: 6-10 /HPF / WBC 0-2   Sq Epi: x / Non Sq Epi: Moderate / Bacteria: Occasional No

## 2022-10-31 NOTE — ED PROVIDER NOTE - NS ED MD DISPO SPECIAL CONSIDERATION1
In order to meet Medicare requirements, the clinical documentation must support the information cited in the admission order.  Please be sure to provide detailed and clear documentation about the following in the admitting note/history and physical: None

## 2022-12-21 NOTE — DIETITIAN INITIAL EVALUATION ADULT. - SIGNS/SYMPTOMS
Graft Donor Site Bandage (Optional-Leave Blank If You Don't Want In Note): Steri-strips and a pressure bandage were applied to the donor site. unintentional wt loss (not clinically significant), reported fluctuations of appetite

## 2024-12-07 NOTE — CONSULT NOTE ADULT - REASON FOR ADMISSION
lethargy
Admit for seizure management /  new hemorrhage into left frontal brain lesion - non surgical CTH findings
lethargy
07-Dec-2024 21:57

## 2025-03-14 NOTE — PATIENT PROFILE ADULT - NSTOBACCONEVERSMOKERY/N_GEN_A
Including the following information whenever applicable but not limited to:  [x] SBAR report given to receiving nurse: PACU RN  [x] Patient's name, procedure name, surgeon/s name  [x] Medical/social history including allergies & code status  [x] Anesthesia provider, type and tolerance  [x] Level of consciousness/orientation  [x] Last vital signs including temperature:  SEE ANESTHESIA RECORD  [x] Status of surgical site, dressings  [x] IV lines:  SEE LDA AVATAR  [x] Antibiotic(s) given       [x] Intake and output             [x] Family/friend location  [x] SCDs on     Yes

## 2025-04-22 NOTE — DISCHARGE NOTE ADULT - NS AS DC FU [facility] EJECTION FRACTION >40 YES
Quality 226: Preventive Care And Screening: Tobacco Use: Screening And Cessation Intervention: Patient screened for tobacco use and is an ex/non-smoker Quality 130: Documentation Of Current Medications In The Medical Record: Current Medications Documented Detail Level: Detailed Quality 431: Preventive Care And Screening: Unhealthy Alcohol Use - Screening: Patient not identified as an unhealthy alcohol user when screened for unhealthy alcohol use using a systematic screening method Ejection Fraction(%)...
